# Patient Record
Sex: FEMALE | Race: BLACK OR AFRICAN AMERICAN | NOT HISPANIC OR LATINO | Employment: FULL TIME | URBAN - METROPOLITAN AREA
[De-identification: names, ages, dates, MRNs, and addresses within clinical notes are randomized per-mention and may not be internally consistent; named-entity substitution may affect disease eponyms.]

---

## 2017-05-26 ENCOUNTER — OFFICE VISIT (OUTPATIENT)
Dept: OBSTETRICS AND GYNECOLOGY | Facility: CLINIC | Age: 37
End: 2017-05-26
Payer: OTHER GOVERNMENT

## 2017-05-26 VITALS
HEIGHT: 64 IN | WEIGHT: 126.19 LBS | DIASTOLIC BLOOD PRESSURE: 72 MMHG | HEART RATE: 57 BPM | BODY MASS INDEX: 21.54 KG/M2 | SYSTOLIC BLOOD PRESSURE: 106 MMHG

## 2017-05-26 DIAGNOSIS — Z01.419 ENCOUNTER FOR WELL WOMAN EXAM WITH ROUTINE GYNECOLOGICAL EXAM: Primary | ICD-10-CM

## 2017-05-26 PROCEDURE — 88175 CYTOPATH C/V AUTO FLUID REDO: CPT | Performed by: PATHOLOGY

## 2017-05-26 PROCEDURE — 99999 PR PBB SHADOW E&M-EST. PATIENT-LVL II: CPT | Mod: PBBFAC,,, | Performed by: OBSTETRICS & GYNECOLOGY

## 2017-05-26 PROCEDURE — 99212 OFFICE O/P EST SF 10 MIN: CPT | Mod: PBBFAC,PO | Performed by: OBSTETRICS & GYNECOLOGY

## 2017-05-26 PROCEDURE — 99395 PREV VISIT EST AGE 18-39: CPT | Mod: S$PBB,,, | Performed by: OBSTETRICS & GYNECOLOGY

## 2017-05-26 PROCEDURE — 88141 CYTOPATH C/V INTERPRET: CPT | Mod: ,,, | Performed by: PATHOLOGY

## 2017-05-28 NOTE — PROGRESS NOTES
SUBJECTIVE:   36 y.o. female   for annual routine Pap and checkup. No LMP recorded. Patient is not currently having periods (Reason: Birth Control)..  She has no unusual complaints.        Past Medical History:   Diagnosis Date    PTSD (post-traumatic stress disorder)      Past Surgical History:   Procedure Laterality Date    HERNIA REPAIR       Social History     Social History    Marital status: Single     Spouse name: N/A    Number of children: N/A    Years of education: N/A     Occupational History    Not on file.     Social History Main Topics    Smoking status: Never Smoker    Smokeless tobacco: Not on file    Alcohol use No    Drug use: No    Sexual activity: Yes     Partners: Male     Birth control/ protection: Injection     Other Topics Concern    Not on file     Social History Narrative    No narrative on file     Family History   Problem Relation Age of Onset    Breast cancer Neg Hx     Miscarriages / Stillbirths Neg Hx      OB History    Para Term  AB Living   1 1  1     SAB TAB Ectopic Multiple Live Births             # Outcome Date GA Lbr Eliseo/2nd Weight Sex Delivery Anes PTL Lv   1   26w0d    Vag-Spont               Current Outpatient Prescriptions   Medication Sig Dispense Refill    medroxyPROGESTERone (DEPO-PROVERA) 150 mg/mL injection Inject 150 mg into the muscle every 3 (three) months.       No current facility-administered medications for this visit.      Allergies: Review of patient's allergies indicates no known allergies.     ROS:  Constitutional: no weight loss, weight gain, fever, fatigue  Eyes:  No vision changes, glasses/contacts  ENT/Mouth: No ulcers, sinus problems, ears ringing, headache  Cardiovascular: No inability to lie flat, chest pain, exercise intolerance, swelling, heart palpitations  Respiratory: No wheezing, coughing blood, shortness of breath, or cough  Gastrointestinal: No diarrhea, bloody stool, nausea/vomiting, constipation, gas,  "hemorrhoids  Genitourinary: No blood in urine, painful urination, urgency of urination, frequency of urination, incomplete emptying, incontinence, abnormal bleeding, painful periods, heavy periods, vaginal discharge, vaginal odor, painful intercourse, sexual problems, bleeding after intercourse.  Musculoskeletal: No muscle weakness  Skin/Breast: No painful breasts, nipple discharge, masses, rash, ulcers  Neurological: No passing out, seizures, numbness, headache  Endocrine: No diabetes, hypothyroid, hyperthyroid, hot flashes, hair loss, abnormal hair growth, ance  Psychiatric: No depression, crying  Hematologic: No bruises, bleeding, swollen lymph nodes, anemia.      OBJECTIVE:   The patient appears well, alert, oriented x 3, in no distress.  /72   Pulse (!) 57   Ht 5' 4" (1.626 m)   Wt 57.2 kg (126 lb 3.4 oz)   BMI 21.66 kg/m²   NECK: no thyromegaly, trachea midline  SKIN: no acne, striae, hirsutism  BREAST EXAM: breasts appear normal, no suspicious masses, no skin or nipple changes or axillary nodes  ABDOMEN: no hernias, masses, or hepatosplenomegaly  GENITALIA: normal external genitalia, no erythema, no discharge  URETHRA: normal urethra, normal urethral meatus  VAGINA: Normal  CERVIX: no lesions or cervical motion tenderness  UTERUS: normal size, contour, position, consistency, mobility, non-tender  ADNEXA: normal adnexa    \  ASSESSMENT:   well woman    PLAN:   pap smear  counseled on breast self exam  return annually or prn  "

## 2017-06-05 ENCOUNTER — TELEPHONE (OUTPATIENT)
Dept: OBSTETRICS AND GYNECOLOGY | Facility: CLINIC | Age: 37
End: 2017-06-05

## 2017-06-14 ENCOUNTER — OFFICE VISIT (OUTPATIENT)
Dept: OBSTETRICS AND GYNECOLOGY | Facility: CLINIC | Age: 37
End: 2017-06-14
Payer: COMMERCIAL

## 2017-06-14 ENCOUNTER — APPOINTMENT (OUTPATIENT)
Dept: LAB | Facility: HOSPITAL | Age: 37
End: 2017-06-14
Attending: OBSTETRICS & GYNECOLOGY
Payer: OTHER GOVERNMENT

## 2017-06-14 VITALS
DIASTOLIC BLOOD PRESSURE: 59 MMHG | HEIGHT: 64 IN | HEART RATE: 60 BPM | SYSTOLIC BLOOD PRESSURE: 106 MMHG | WEIGHT: 128.44 LBS | BODY MASS INDEX: 21.93 KG/M2

## 2017-06-14 DIAGNOSIS — R87.612 LGSIL ON PAP SMEAR OF CERVIX: Primary | ICD-10-CM

## 2017-06-14 DIAGNOSIS — Z01.812 PRE-PROCEDURE LAB EXAM: ICD-10-CM

## 2017-06-14 LAB
B-HCG UR QL: NEGATIVE
CTP QC/QA: YES

## 2017-06-14 PROCEDURE — 81025 URINE PREGNANCY TEST: CPT | Mod: PBBFAC,PO | Performed by: OBSTETRICS & GYNECOLOGY

## 2017-06-14 PROCEDURE — 99499 UNLISTED E&M SERVICE: CPT | Mod: S$PBB,,, | Performed by: OBSTETRICS & GYNECOLOGY

## 2017-06-14 PROCEDURE — 57454 BX/CURETT OF CERVIX W/SCOPE: CPT | Mod: PBBFAC,PO | Performed by: OBSTETRICS & GYNECOLOGY

## 2017-06-14 PROCEDURE — 88341 IMHCHEM/IMCYTCHM EA ADD ANTB: CPT | Mod: 26,,, | Performed by: PATHOLOGY

## 2017-06-14 PROCEDURE — 99212 OFFICE O/P EST SF 10 MIN: CPT | Mod: PBBFAC,PO | Performed by: OBSTETRICS & GYNECOLOGY

## 2017-06-14 PROCEDURE — 88305 TISSUE EXAM BY PATHOLOGIST: CPT | Performed by: PATHOLOGY

## 2017-06-14 PROCEDURE — 88342 IMHCHEM/IMCYTCHM 1ST ANTB: CPT | Mod: 26,,, | Performed by: PATHOLOGY

## 2017-06-14 PROCEDURE — 99999 PR PBB SHADOW E&M-EST. PATIENT-LVL II: CPT | Mod: PBBFAC,,, | Performed by: OBSTETRICS & GYNECOLOGY

## 2017-06-14 PROCEDURE — 57456 ENDOCERV CURETTAGE W/SCOPE: CPT | Mod: S$PBB,,, | Performed by: OBSTETRICS & GYNECOLOGY

## 2017-06-14 PROCEDURE — 88305 TISSUE EXAM BY PATHOLOGIST: CPT | Mod: 26,59,, | Performed by: PATHOLOGY

## 2017-06-14 NOTE — PROGRESS NOTES
COLPOSCOPY:    Gretta Carson is a 36 y.o. female   presents for colposcopy.  No LMP recorded. Patient is not currently having periods (Reason: Birth Control)..  Her most recent pap smear shows low-grade squamous intraepithelial neoplasia (LGSIL - encompassing HPV,mild dysplasia,AMANDA I).      The abnormal test findings were discussed, as well as HPV infection, need for colposcopy and possible biopsies to determine the plan of care, treatments available, the minimal risk of bleeding and infection with colposcopy, and alternatives to colposcopy and she agrees to proceed.      UPT is negative    COLPOSCOPY EXAM:   TIME OUT PERFORMED.     visible lesion(s) at 5 o'clock    Biopsy was taken at 5 o'clock.  ECC was performed    Hemostasis was adequate with application of silver nitrate.  The speculum was removed.  The patient did tolerate the procedure well.    All collected specimens sent to pathology for histologic analysis.    Post-colposcopy counseling:  The patient was instructed to manage post-colposcopy cramping with NSAIDs or Tylenol, or with a prescription per the medication card.  Avoid intercourse, douching, or tampons in the vagina for at least 2-3 days.  Expect a clumpy blackish discharge due to Monsel's solution application for several days.  Report heavy bleeding, worsening pain or pain that does not respond to above medications, or foul-smelling vaginal discharge. HPV vaccine recommended according to FDA age guidelines.  Importance of follow-up stressed.      Follow up based on colposcopy results.

## 2017-06-21 ENCOUNTER — TELEPHONE (OUTPATIENT)
Dept: OBSTETRICS AND GYNECOLOGY | Facility: CLINIC | Age: 37
End: 2017-06-21

## 2017-07-07 ENCOUNTER — OFFICE VISIT (OUTPATIENT)
Dept: OBSTETRICS AND GYNECOLOGY | Facility: CLINIC | Age: 37
End: 2017-07-07
Payer: OTHER GOVERNMENT

## 2017-07-07 VITALS
SYSTOLIC BLOOD PRESSURE: 109 MMHG | BODY MASS INDEX: 21.51 KG/M2 | HEIGHT: 64 IN | HEART RATE: 67 BPM | DIASTOLIC BLOOD PRESSURE: 76 MMHG | WEIGHT: 126 LBS

## 2017-07-07 DIAGNOSIS — D06.9 CIN III (CERVICAL INTRAEPITHELIAL NEOPLASIA GRADE III) WITH SEVERE DYSPLASIA: Primary | ICD-10-CM

## 2017-07-07 PROCEDURE — 99212 OFFICE O/P EST SF 10 MIN: CPT | Mod: PBBFAC,PO | Performed by: OBSTETRICS & GYNECOLOGY

## 2017-07-07 PROCEDURE — 99999 PR PBB SHADOW E&M-EST. PATIENT-LVL II: CPT | Mod: PBBFAC,,, | Performed by: OBSTETRICS & GYNECOLOGY

## 2017-07-07 PROCEDURE — 99213 OFFICE O/P EST LOW 20 MIN: CPT | Mod: S$PBB,,, | Performed by: OBSTETRICS & GYNECOLOGY

## 2017-07-13 ENCOUNTER — TELEPHONE (OUTPATIENT)
Dept: OBSTETRICS AND GYNECOLOGY | Facility: CLINIC | Age: 37
End: 2017-07-13

## 2017-07-13 ENCOUNTER — PATIENT MESSAGE (OUTPATIENT)
Dept: OBSTETRICS AND GYNECOLOGY | Facility: CLINIC | Age: 37
End: 2017-07-13

## 2017-07-13 NOTE — TELEPHONE ENCOUNTER
----- Message from Min Haines sent at 7/13/2017 10:09 AM CDT -----  Contact: pt   Pt is requesting a callback, have some questions about her procedure on 7-31-17  Call Back#[phone]  Thanks

## 2017-07-13 NOTE — TELEPHONE ENCOUNTER
Left message informing patient that I would forward info to her via [facility] and to call back if she has additional questions.

## 2017-07-27 ENCOUNTER — PATIENT MESSAGE (OUTPATIENT)
Dept: OBSTETRICS AND GYNECOLOGY | Facility: CLINIC | Age: 37
End: 2017-07-27

## 2017-07-31 ENCOUNTER — TELEPHONE (OUTPATIENT)
Dept: OBSTETRICS AND GYNECOLOGY | Facility: CLINIC | Age: 37
End: 2017-07-31

## 2017-07-31 ENCOUNTER — PATIENT MESSAGE (OUTPATIENT)
Dept: OBSTETRICS AND GYNECOLOGY | Facility: CLINIC | Age: 37
End: 2017-07-31

## 2017-07-31 DIAGNOSIS — D06.9 CIN III (CERVICAL INTRAEPITHELIAL NEOPLASIA GRADE III) WITH SEVERE DYSPLASIA: Primary | ICD-10-CM

## 2017-07-31 NOTE — TELEPHONE ENCOUNTER
Would the [facility] have something in their system if you have it scheduled at the surgery suite is it all connected??

## 2017-07-31 NOTE — TELEPHONE ENCOUNTER
----- Message from Tiffani Lott sent at 7/31/2017 10:41 AM CDT -----  Contact: self  Please call back at [phone] ([phone]) --personal

## 2017-08-01 ENCOUNTER — ANESTHESIA EVENT (OUTPATIENT)
Dept: SURGERY | Facility: AMBULARY SURGERY CENTER | Age: 37
End: 2017-08-01
Payer: OTHER GOVERNMENT

## 2017-08-01 NOTE — TELEPHONE ENCOUNTER
----- Message from Caroline Frost sent at 8/1/2017 10:33 AM CDT -----  Contact: Clary Beebe would like to speak to a nurse regarding the patient's short term disability   Needs a surgery date and diagnosis code    Please call her at  [phone]   Claim# [id number]    Thanks

## 2017-08-02 ENCOUNTER — TELEPHONE (OUTPATIENT)
Dept: OBSTETRICS AND GYNECOLOGY | Facility: CLINIC | Age: 37
End: 2017-08-02

## 2017-08-02 ENCOUNTER — SURGERY (OUTPATIENT)
Age: 37
End: 2017-08-02

## 2017-08-02 ENCOUNTER — ANESTHESIA (OUTPATIENT)
Dept: SURGERY | Facility: AMBULARY SURGERY CENTER | Age: 37
End: 2017-08-02
Payer: OTHER GOVERNMENT

## 2017-08-02 ENCOUNTER — HOSPITAL ENCOUNTER (OUTPATIENT)
Facility: AMBULARY SURGERY CENTER | Age: 37
Discharge: HOME | End: 2017-08-02
Attending: OBSTETRICS & GYNECOLOGY | Admitting: OBSTETRICS & GYNECOLOGY
Payer: OTHER GOVERNMENT

## 2017-08-02 DIAGNOSIS — D06.9 CIN III (CERVICAL INTRAEPITHELIAL NEOPLASIA GRADE III) WITH SEVERE DYSPLASIA: Primary | ICD-10-CM

## 2017-08-02 LAB
B-HCG UR QL: NEGATIVE
CTP QC/QA: YES

## 2017-08-02 PROCEDURE — 88305 TISSUE EXAM BY PATHOLOGIST: CPT | Performed by: PATHOLOGY

## 2017-08-02 PROCEDURE — 57522 CONIZATION OF CERVIX: CPT | Mod: ,,, | Performed by: OBSTETRICS & GYNECOLOGY

## 2017-08-02 PROCEDURE — 57522 CONIZATION OF CERVIX: CPT | Performed by: OBSTETRICS & GYNECOLOGY

## 2017-08-02 PROCEDURE — D9220A PRA ANESTHESIA: Mod: ,,, | Performed by: ANESTHESIOLOGY

## 2017-08-02 RX ORDER — DEXAMETHASONE SODIUM PHOSPHATE 4 MG/ML
INJECTION, SOLUTION INTRA-ARTICULAR; INTRALESIONAL; INTRAMUSCULAR; INTRAVENOUS; SOFT TISSUE
Status: DISCONTINUED
Start: 2017-08-02 | End: 2017-08-02 | Stop reason: HOSPADM

## 2017-08-02 RX ORDER — LIDOCAINE HYDROCHLORIDE 10 MG/ML
1 INJECTION, SOLUTION EPIDURAL; INFILTRATION; INTRACAUDAL; PERINEURAL ONCE
Status: COMPLETED | OUTPATIENT
Start: 2017-08-02 | End: 2017-08-02

## 2017-08-02 RX ORDER — DIPHENHYDRAMINE HYDROCHLORIDE 50 MG/ML
25 INJECTION INTRAMUSCULAR; INTRAVENOUS EVERY 6 HOURS PRN
Status: DISCONTINUED | OUTPATIENT
Start: 2017-08-02 | End: 2017-08-02 | Stop reason: HOSPADM

## 2017-08-02 RX ORDER — OXYCODONE AND ACETAMINOPHEN 5; 325 MG/1; MG/1
1 TABLET ORAL EVERY 4 HOURS PRN
Qty: 20 TABLET | Refills: 0 | Status: SHIPPED | OUTPATIENT
Start: 2017-08-02 | End: 2017-09-01

## 2017-08-02 RX ORDER — IBUPROFEN 800 MG/1
800 TABLET ORAL EVERY 8 HOURS PRN
Qty: 30 TABLET | Refills: 0 | Status: SHIPPED | OUTPATIENT
Start: 2017-08-02 | End: 2017-11-03

## 2017-08-02 RX ORDER — ONDANSETRON 2 MG/ML
INJECTION INTRAMUSCULAR; INTRAVENOUS
Status: DISCONTINUED
Start: 2017-08-02 | End: 2017-08-02 | Stop reason: HOSPADM

## 2017-08-02 RX ORDER — OXYCODONE HYDROCHLORIDE 5 MG/1
5 TABLET ORAL ONCE AS NEEDED
Status: COMPLETED | OUTPATIENT
Start: 2017-08-03 | End: 2017-08-02

## 2017-08-02 RX ORDER — MIDAZOLAM HYDROCHLORIDE 1 MG/ML
INJECTION INTRAMUSCULAR; INTRAVENOUS
Status: DISCONTINUED
Start: 2017-08-02 | End: 2017-08-02 | Stop reason: HOSPADM

## 2017-08-02 RX ORDER — ONDANSETRON 2 MG/ML
4 INJECTION INTRAMUSCULAR; INTRAVENOUS ONCE
Status: DISCONTINUED | OUTPATIENT
Start: 2017-08-02 | End: 2017-08-02 | Stop reason: HOSPADM

## 2017-08-02 RX ORDER — FENTANYL CITRATE 50 UG/ML
INJECTION, SOLUTION INTRAMUSCULAR; INTRAVENOUS
Status: DISCONTINUED | OUTPATIENT
Start: 2017-08-02 | End: 2017-08-02

## 2017-08-02 RX ORDER — ONDANSETRON 2 MG/ML
INJECTION INTRAMUSCULAR; INTRAVENOUS
Status: DISCONTINUED | OUTPATIENT
Start: 2017-08-02 | End: 2017-08-02

## 2017-08-02 RX ORDER — MEPERIDINE HYDROCHLORIDE 25 MG/ML
12.5 INJECTION INTRAMUSCULAR; INTRAVENOUS; SUBCUTANEOUS ONCE AS NEEDED
Status: DISCONTINUED | OUTPATIENT
Start: 2017-08-02 | End: 2017-08-02 | Stop reason: HOSPADM

## 2017-08-02 RX ORDER — HYDROMORPHONE HYDROCHLORIDE 2 MG/ML
0.2 INJECTION, SOLUTION INTRAMUSCULAR; INTRAVENOUS; SUBCUTANEOUS EVERY 5 MIN PRN
Status: DISCONTINUED | OUTPATIENT
Start: 2017-08-02 | End: 2017-08-02 | Stop reason: HOSPADM

## 2017-08-02 RX ORDER — PROPOFOL 10 MG/ML
INJECTION, EMULSION INTRAVENOUS
Status: DISCONTINUED
Start: 2017-08-02 | End: 2017-08-02 | Stop reason: HOSPADM

## 2017-08-02 RX ORDER — FENTANYL CITRATE 50 UG/ML
INJECTION, SOLUTION INTRAMUSCULAR; INTRAVENOUS
Status: DISCONTINUED
Start: 2017-08-02 | End: 2017-08-02 | Stop reason: HOSPADM

## 2017-08-02 RX ORDER — IBUPROFEN 200 MG
800 TABLET ORAL ONCE
Status: COMPLETED | OUTPATIENT
Start: 2017-08-02 | End: 2017-08-02

## 2017-08-02 RX ORDER — HYDROCODONE BITARTRATE AND ACETAMINOPHEN 5; 325 MG/1; MG/1
1 TABLET ORAL EVERY 4 HOURS PRN
Status: DISCONTINUED | OUTPATIENT
Start: 2017-08-02 | End: 2017-08-02 | Stop reason: HOSPADM

## 2017-08-02 RX ORDER — LIDOCAINE HYDROCHLORIDE 20 MG/ML
INJECTION, SOLUTION EPIDURAL; INFILTRATION; INTRACAUDAL; PERINEURAL
Status: DISCONTINUED
Start: 2017-08-02 | End: 2017-08-02 | Stop reason: HOSPADM

## 2017-08-02 RX ORDER — SODIUM CHLORIDE 0.9 % (FLUSH) 0.9 %
3 SYRINGE (ML) INJECTION
Status: DISCONTINUED | OUTPATIENT
Start: 2017-08-02 | End: 2017-08-02 | Stop reason: HOSPADM

## 2017-08-02 RX ORDER — IBUPROFEN 200 MG
TABLET ORAL
Status: COMPLETED
Start: 2017-08-02 | End: 2017-08-02

## 2017-08-02 RX ORDER — FENTANYL CITRATE 50 UG/ML
25 INJECTION, SOLUTION INTRAMUSCULAR; INTRAVENOUS EVERY 5 MIN PRN
Status: DISCONTINUED | OUTPATIENT
Start: 2017-08-02 | End: 2017-08-02 | Stop reason: HOSPADM

## 2017-08-02 RX ORDER — DEXAMETHASONE SODIUM PHOSPHATE 4 MG/ML
INJECTION, SOLUTION INTRA-ARTICULAR; INTRALESIONAL; INTRAMUSCULAR; INTRAVENOUS; SOFT TISSUE
Status: DISCONTINUED | OUTPATIENT
Start: 2017-08-02 | End: 2017-08-02

## 2017-08-02 RX ORDER — PROPOFOL 10 MG/ML
VIAL (ML) INTRAVENOUS
Status: DISCONTINUED | OUTPATIENT
Start: 2017-08-02 | End: 2017-08-02

## 2017-08-02 RX ORDER — LIDOCAINE HYDROCHLORIDE 20 MG/ML
JELLY TOPICAL
Status: DISCONTINUED
Start: 2017-08-02 | End: 2017-08-02 | Stop reason: HOSPADM

## 2017-08-02 RX ORDER — LIDOCAINE HCL/PF 100 MG/5ML
SYRINGE (ML) INTRAVENOUS
Status: DISCONTINUED | OUTPATIENT
Start: 2017-08-02 | End: 2017-08-02

## 2017-08-02 RX ORDER — SODIUM CHLORIDE, SODIUM LACTATE, POTASSIUM CHLORIDE, CALCIUM CHLORIDE 600; 310; 30; 20 MG/100ML; MG/100ML; MG/100ML; MG/100ML
INJECTION, SOLUTION INTRAVENOUS CONTINUOUS
Status: DISCONTINUED | OUTPATIENT
Start: 2017-08-02 | End: 2017-08-02 | Stop reason: HOSPADM

## 2017-08-02 RX ORDER — GLYCOPYRROLATE 0.2 MG/ML
INJECTION INTRAMUSCULAR; INTRAVENOUS
Status: DISCONTINUED | OUTPATIENT
Start: 2017-08-02 | End: 2017-08-02

## 2017-08-02 RX ORDER — BUPIVACAINE HYDROCHLORIDE AND EPINEPHRINE 2.5; 5 MG/ML; UG/ML
INJECTION, SOLUTION EPIDURAL; INFILTRATION; INTRACAUDAL; PERINEURAL
Status: DISCONTINUED
Start: 2017-08-02 | End: 2017-08-02 | Stop reason: WASHOUT

## 2017-08-02 RX ORDER — OXYCODONE HYDROCHLORIDE 5 MG/1
TABLET ORAL
Status: DISCONTINUED
Start: 2017-08-02 | End: 2017-08-02 | Stop reason: HOSPADM

## 2017-08-02 RX ORDER — KETOROLAC TROMETHAMINE 30 MG/ML
INJECTION, SOLUTION INTRAMUSCULAR; INTRAVENOUS
Status: DISCONTINUED
Start: 2017-08-02 | End: 2017-08-02 | Stop reason: HOSPADM

## 2017-08-02 RX ORDER — MIDAZOLAM HYDROCHLORIDE 1 MG/ML
INJECTION, SOLUTION INTRAMUSCULAR; INTRAVENOUS
Status: DISCONTINUED | OUTPATIENT
Start: 2017-08-02 | End: 2017-08-02

## 2017-08-02 RX ADMIN — SODIUM CHLORIDE, SODIUM LACTATE, POTASSIUM CHLORIDE, CALCIUM CHLORIDE: 600; 310; 30; 20 INJECTION, SOLUTION INTRAVENOUS at 11:08

## 2017-08-02 RX ADMIN — MIDAZOLAM HYDROCHLORIDE 2 MG: 1 INJECTION, SOLUTION INTRAMUSCULAR; INTRAVENOUS at 11:08

## 2017-08-02 RX ADMIN — GLYCOPYRROLATE 0.2 MG: 0.2 INJECTION INTRAMUSCULAR; INTRAVENOUS at 12:08

## 2017-08-02 RX ADMIN — Medication 800 MG: at 02:08

## 2017-08-02 RX ADMIN — FENTANYL CITRATE 50 MCG: 50 INJECTION, SOLUTION INTRAMUSCULAR; INTRAVENOUS at 12:08

## 2017-08-02 RX ADMIN — OXYCODONE HYDROCHLORIDE 5 MG: 5 TABLET ORAL at 01:08

## 2017-08-02 RX ADMIN — LIDOCAINE HYDROCHLORIDE 0.2 ML: 10 INJECTION, SOLUTION EPIDURAL; INFILTRATION; INTRACAUDAL; PERINEURAL at 11:08

## 2017-08-02 RX ADMIN — Medication 150 MG: at 12:08

## 2017-08-02 RX ADMIN — ONDANSETRON 4 MG: 2 INJECTION INTRAMUSCULAR; INTRAVENOUS at 12:08

## 2017-08-02 RX ADMIN — Medication 100 MG: at 12:08

## 2017-08-02 RX ADMIN — DEXAMETHASONE SODIUM PHOSPHATE 4 MG: 4 INJECTION, SOLUTION INTRA-ARTICULAR; INTRALESIONAL; INTRAMUSCULAR; INTRAVENOUS; SOFT TISSUE at 12:08

## 2017-08-02 NOTE — TRANSFER OF CARE
"Anesthesia Transfer of Care Note    Patient: Gretta Carson    Procedure(s) Performed: Procedure(s) (LRB):  LEEP PROCEDURE (N/A)    Patient location: PACU    Anesthesia Type: general    Transport from OR: Transported from OR on 2-3 L/min O2 by NC with adequate spontaneous ventilation    Post pain: adequate analgesia    Post assessment: no apparent anesthetic complications    Post vital signs: stable    Level of consciousness: awake    Nausea/Vomiting: no nausea/vomiting    Complications: none    Transfer of care protocol was followed      Last vitals:   Visit Vitals  BP (!) 96/53   Pulse 65   Temp 36.8 °C (98.3 °F) (Oral)   Resp 16   Ht 5' 4" (1.626 m)   Wt 56.7 kg (125 lb)   SpO2 97%   Breastfeeding? No   BMI 21.46 kg/m²     "

## 2017-08-02 NOTE — ANESTHESIA PREPROCEDURE EVALUATION
08/02/2017  Gretta Carson is a 36 y.o., female.    Anesthesia Evaluation    I have reviewed the Patient Summary Reports.    I have reviewed the Nursing Notes.   I have reviewed the Medications.     Review of Systems  Anesthesia Hx:  No problems with previous Anesthesia Denies Hx of Anesthetic complications    Social:  Non-Smoker    Cardiovascular:   Denies Hypertension.  Denies MI.  Denies CAD.    Denies CABG/stent.   Denies Angina.    Pulmonary:   Denies COPD.  Denies Asthma.  Denies Recent URI.    Renal/:   Denies Chronic Renal Disease.     Hepatic/GI:   Denies GERD. Denies Liver Disease.    OB/GYN/PEDS:  AMANDA III (cervical intraepithelial neoplasia grade III) with severe dysplasia (D06.9)   Neurological:   Denies TIA. Denies CVA. Denies Seizures.    Endocrine:   Denies Diabetes. Denies Hypothyroidism.    Psych:   Denies Psychiatric History. PTSD (post-traumatic stress disorder)         Physical Exam  General:  Well nourished    Airway/Jaw/Neck:  Airway Findings: Mouth Opening: Normal Tongue: Normal  General Airway Assessment: Adult, Good  Mallampati: II  Improves to II with phonation.  TM Distance: 4-6 cm      Dental:  Dental Findings: In tact   Chest/Lungs:  Chest/Lungs Findings: Clear to auscultation, Normal Respiratory Rate     Heart/Vascular:  Heart Findings: Rate: Normal  Rhythm: Regular Rhythm  Sounds: Normal  Heart murmur: negative       Mental Status:  Mental Status Findings:  Cooperative, Alert and Oriented         Anesthesia Plan  Type of Anesthesia, risks & benefits discussed:  Anesthesia Type:  general  Patient's Preference:   Intra-op Monitoring Plan:   Intra-op Monitoring Plan Comments:   Post Op Pain Control Plan:   Post Op Pain Control Plan Comments:   Induction:   IV  Beta Blocker:  Patient is not currently on a Beta-Blocker (No further documentation required).       Informed Consent:  Patient understands risks and agrees with Anesthesia plan.  Questions answered. Anesthesia consent signed with patient.  ASA Score: 1     Day of Surgery Review of History & Physical: I have interviewed and examined the patient. I have reviewed the patient's H&P dated:  There are no significant changes.          Ready For Surgery From Anesthesia Perspective.

## 2017-08-02 NOTE — H&P (VIEW-ONLY)
36 y.o.  presents for pre-op H&P for leep.  No LMP recorded. Patient is not currently having periods (Reason: Birth Control)..    Past Medical History:   Diagnosis Date    Abnormal Pap smear of cervix     PTSD (post-traumatic stress disorder)      Past Surgical History:   Procedure Laterality Date    COLPOSCOPY      HERNIA REPAIR       Family History   Problem Relation Age of Onset    Breast cancer Neg Hx     Miscarriages / Stillbirths Neg Hx      Review of patient's allergies indicates:  No Known Allergies    Current Outpatient Prescriptions:     medroxyPROGESTERone (DEPO-PROVERA) 150 mg/mL injection, Inject 150 mg into the muscle every 3 (three) months., Disp: , Rfl:   Social History     Social History    Marital status: Single     Spouse name: N/A    Number of children: N/A    Years of education: N/A     Occupational History    Not on file.     Social History Main Topics    Smoking status: Never Smoker    Smokeless tobacco: Never Used    Alcohol use No    Drug use: No    Sexual activity: Yes     Partners: Male     Birth control/ protection: Injection     Other Topics Concern    Not on file     Social History Narrative    No narrative on file         Last pap hsil  Last pathology janee III  Last ultrasound none    Vitals:    17 1410   BP: 109/76   Pulse: 67     General Appearance: Alert, appropriate appearance for age. No acute distress, Chest/Respiratory Exam: Normal.   Cardiovascular Exam: regular rate and rhythm   Gastrointestinal Exam: soft, non-tender; bowel sounds normal; no masses,  no organomegaly  Pelvic Exam Female: Exam deferred.   Psychiatric Exam: Alert and oriented, appropriate affect.    Assessment: JANEE III    Plan: LEEP scheduled  I have discussed the risks, benefits, indications, and alternatives of the procedure in detail.  The patient verbalizes her understanding.  All questions answered.  Consents signed.  The patient agrees to proceed to proceed as planned.

## 2017-08-02 NOTE — TELEPHONE ENCOUNTER
----- Message from Risa Gerber sent at 8/2/2017  1:17 PM CDT -----  Contact: Michelle with [facility]  Patient needs 2 week appointment due to discharged on 08/02/17 post op. Please call patient at [phone]. Thanks!

## 2017-08-02 NOTE — DISCHARGE INSTRUCTIONS
No douches, tampons, intercourse or tub baths for 1 week;     Some bleeding and pain is expected, but should be no greater than a normal period. If heavy bleeding or pain persists, please contact your doctor;     Keep followup appointment as scheduled.     Do not drive or make important business or legal decisions for 24 hours.     Call MD if severe bleeding, fever > 101, nausea/vomitting    Rx for pain:  Ibuprofen, Percocet

## 2017-08-02 NOTE — DISCHARGE SUMMARY
Discharge Summary  Gynecology      Admit Date: 8/2/2017    Discharge Date and Time: 8/2/2017 1:33 PM     Attending Physician: Shannan Pierre MD    Principal Diagnoses: AMANDA III (cervical intraepithelial neoplasia grade III) with severe dysplasia    Other Secondary Diagnoses:     Discharged Condition: good    Procedure: leep    Disposition: Home or Self Care    Patient Instructions:   Current Discharge Medication List      CONTINUE these medications which have NOT CHANGED    Details   medroxyPROGESTERone (DEPO-PROVERA) 150 mg/mL injection Inject 150 mg into the muscle every 3 (three) months.             No discharge procedures on file.    Diet: regular    Activity: pelvic rest, no heavy lifting, no driving    Follow up: 2 weeks

## 2017-08-02 NOTE — PLAN OF CARE
Stable, States ready to go home, renetta po fluids, pain tolerable, amb to BR then to car with RN and mother

## 2017-08-02 NOTE — LETTER
August 2, 2017                       [address] [place] [id number]  Phone: [phone]   August 2, 2017     Patient: Gretta Carson   YOB: 1980   Date of Visit: 7/31/2017       To Whom it May Concern:    Gretta Carson was scheduled for surgery on Monday, 7/31/17.  On that morning, I realized that I forgot to put the order for surgery in the computer and therefore, it was not transmitted to her insurance and the surgery center.  I called and explained to her that day that I made an error.  I tried to expedite insurance approval and planned to do the surgery immediately on insurance approval.  It was approved on Tuesday evening, and surgery will take place on today, 8/2/17. Ms Carson is not at fault for missing work, it was my error.        If you have any questions or concerns, please don't hesitate to call.    Sincerely,           Shannan Pierre MD

## 2017-08-02 NOTE — OP NOTE
DATE of Procedure:  8/2/17    PREOP DIAGNOSIS: AMANDA III     POSTOP DIAGNOSIS:  same     PROCEDURES: LEEP     ANESTHESIA: general     ESTIMATED BLOOD LOSS: 100     COMPLICATIONS: none       PROCEDURE IN Detail:     Pt was taken to the operating room placed in dorsal lithotomy. She was placed under general anesthesia and prepped and draped in normal sterile fashion.  Speculum was placed.  A loop was used to remove the ectocervix in 2 passes.  The specimens were marked with suture at anterior portion.  The base was cauterized with good hemostasis.  Monsels was placed.  speculum was removed.  Lap and needle count was correct.  Pt went to recovery room in stable condition.

## 2017-08-02 NOTE — ANESTHESIA POSTPROCEDURE EVALUATION
"Anesthesia Post Evaluation    Patient: Gretta Carson    Procedure(s) Performed: Procedure(s) (LRB):  LEEP PROCEDURE (N/A)    Final Anesthesia Type: general  Patient location during evaluation: PACU  Patient participation: Yes- Able to Participate  Level of consciousness: awake and alert and oriented  Post-procedure vital signs: reviewed and stable  Pain management: adequate  Airway patency: patent  PONV status at discharge: No PONV  Anesthetic complications: no      Cardiovascular status: blood pressure returned to baseline  Respiratory status: unassisted, spontaneous ventilation and room air  Hydration status: euvolemic  Follow-up not needed.        Visit Vitals  BP (!) 93/57   Pulse 60   Temp 36.8 °C (98.3 °F) (Oral)   Resp 18   Ht 5' 4" (1.626 m)   Wt 56.7 kg (125 lb)   SpO2 100%   Breastfeeding? No   BMI 21.46 kg/m²       Pain/Jadiel Score: Pain Assessment Performed: Yes (8/2/2017 12:38 PM)  Presence of Pain: other (see comments) (asleep) (8/2/2017 12:38 PM)  Jadiel Score: 4 (8/2/2017 12:38 PM)      "

## 2017-08-03 VITALS
HEIGHT: 64 IN | HEART RATE: 62 BPM | TEMPERATURE: 98 F | OXYGEN SATURATION: 99 % | DIASTOLIC BLOOD PRESSURE: 79 MMHG | BODY MASS INDEX: 21.34 KG/M2 | SYSTOLIC BLOOD PRESSURE: 113 MMHG | WEIGHT: 125 LBS | RESPIRATION RATE: 20 BRPM

## 2017-08-08 ENCOUNTER — TELEPHONE (OUTPATIENT)
Dept: OBSTETRICS AND GYNECOLOGY | Facility: CLINIC | Age: 37
End: 2017-08-08

## 2017-08-09 NOTE — TELEPHONE ENCOUNTER
----- Message from Char Hanson sent at 8/9/2017 11:03 AM CDT -----  Contact: Clary Means with [facility] [employer] - [phone]/Claim #[id number]/calling to verify diagnosis/surgery she had done/estimated time of return to work/date of surgery/Clary does have a confidential private line and it is OK to leave a message if she is unable to answer her phone

## 2017-08-14 ENCOUNTER — PATIENT MESSAGE (OUTPATIENT)
Dept: OBSTETRICS AND GYNECOLOGY | Facility: CLINIC | Age: 37
End: 2017-08-14

## 2017-08-16 ENCOUNTER — OFFICE VISIT (OUTPATIENT)
Dept: OBSTETRICS AND GYNECOLOGY | Facility: CLINIC | Age: 37
End: 2017-08-16
Payer: OTHER GOVERNMENT

## 2017-08-16 VITALS
SYSTOLIC BLOOD PRESSURE: 100 MMHG | HEIGHT: 64 IN | HEART RATE: 51 BPM | WEIGHT: 127.63 LBS | DIASTOLIC BLOOD PRESSURE: 76 MMHG | BODY MASS INDEX: 21.79 KG/M2

## 2017-08-16 DIAGNOSIS — Z98.890 POST-OPERATIVE STATE: Primary | ICD-10-CM

## 2017-08-16 PROCEDURE — 99024 POSTOP FOLLOW-UP VISIT: CPT | Mod: ,,, | Performed by: OBSTETRICS & GYNECOLOGY

## 2017-08-16 PROCEDURE — 99213 OFFICE O/P EST LOW 20 MIN: CPT | Mod: PBBFAC,PO | Performed by: OBSTETRICS & GYNECOLOGY

## 2017-08-16 PROCEDURE — 99999 PR PBB SHADOW E&M-EST. PATIENT-LVL III: CPT | Mod: PBBFAC,,, | Performed by: OBSTETRICS & GYNECOLOGY

## 2017-08-30 NOTE — PROGRESS NOTES
Subjective:       Patient ID: Gretta Carson is a 36 y.o. female.    Chief Complaint:  Post-op Evaluation (LEEP)      History of Present Illness  HPI  Postoperative Follow-up  Patient presents to the clinic 2 weeks status post leep for janee III. Eating a regular diet without difficulty. Bowel movements are normal. Pain is controlled without any medications.        GYN & OB HistoryNo LMP recorded. Patient is not currently having periods (Reason: Birth Control).   Date of Last Pap: 2017    OB History    Para Term  AB Living   1 1   1       SAB TAB Ectopic Multiple Live Births                  # Outcome Date GA Lbr Eliseo/2nd Weight Sex Delivery Anes PTL Lv   1   26w0d    Vag-Spont             Review of Systems  Review of Systems   Constitutional: Negative.    Respiratory: Negative.    Cardiovascular: Negative.    Gastrointestinal: Negative.    Genitourinary: Positive for menstrual problem and pelvic pain.   Musculoskeletal: Negative.    Skin:  Negative.   Neurological: Negative.    Psychiatric/Behavioral: Negative.            Objective:    Physical Exam:   Constitutional: She is oriented to person, place, and time. She appears well-developed and well-nourished.    HENT:   Head: Normocephalic and atraumatic.    Eyes: EOM are normal.    Neck: Normal range of motion.    Cardiovascular: Normal rate.     Pulmonary/Chest: Effort normal.        Abdominal: Hernia confirmed negative in the right inguinal area and confirmed negative in the left inguinal area.     Genitourinary: Vagina normal and uterus normal. Rectal exam shows no external hemorrhoid. There is no rash, tenderness, lesion or injury on the right labia. There is no rash, tenderness, lesion or injury on the left labia. No erythema, tenderness, bleeding, rectocele, cystocele or unspecified prolapse of vaginal walls in the vagina. No foreign body in the vagina. No signs of injury around the vagina. No vaginal discharge found. Labial bartholins  normal.Cervix exhibits friability (healing with some bleeding and pain). Cervix exhibits no motion tenderness, no discharge and presence. Also,  no visible IUD strings and no recent pap smear            Musculoskeletal: Normal range of motion and moves all extremeties.      Lymphadenopathy:        Right: No inguinal adenopathy present.        Left: No inguinal adenopathy present.    Neurological: She is alert and oriented to person, place, and time.    Skin: Skin is warm and dry.    Psychiatric: She has a normal mood and affect. Her behavior is normal. Judgment and thought content normal.          Assessment:        1. Post-operative state                Plan:      Follow up 1 week

## 2017-08-31 ENCOUNTER — PATIENT MESSAGE (OUTPATIENT)
Dept: OBSTETRICS AND GYNECOLOGY | Facility: CLINIC | Age: 37
End: 2017-08-31

## 2017-09-01 ENCOUNTER — OFFICE VISIT (OUTPATIENT)
Dept: OBSTETRICS AND GYNECOLOGY | Facility: CLINIC | Age: 37
End: 2017-09-01
Payer: OTHER GOVERNMENT

## 2017-09-01 VITALS
HEART RATE: 74 BPM | DIASTOLIC BLOOD PRESSURE: 67 MMHG | SYSTOLIC BLOOD PRESSURE: 102 MMHG | WEIGHT: 127.31 LBS | HEIGHT: 64 IN | BODY MASS INDEX: 21.74 KG/M2

## 2017-09-01 DIAGNOSIS — Z98.890 POST-OPERATIVE STATE: Primary | ICD-10-CM

## 2017-09-01 PROCEDURE — 99999 PR PBB SHADOW E&M-EST. PATIENT-LVL II: CPT | Mod: PBBFAC,,, | Performed by: OBSTETRICS & GYNECOLOGY

## 2017-09-01 PROCEDURE — 99212 OFFICE O/P EST SF 10 MIN: CPT | Mod: PBBFAC,PO | Performed by: OBSTETRICS & GYNECOLOGY

## 2017-09-01 PROCEDURE — 99024 POSTOP FOLLOW-UP VISIT: CPT | Mod: ,,, | Performed by: OBSTETRICS & GYNECOLOGY

## 2017-09-11 NOTE — PROGRESS NOTES
Subjective:       Patient ID: Gretta Carson is a 36 y.o. female.    Chief Complaint:  Follow-up (still some bleeding, recheck)      History of Present Illness  HPI  Pt here for follow up for bleeding after leep    GYN & OB History  No LMP recorded. Patient is not currently having periods (Reason: Birth Control).   Date of Last Pap: 2017    OB History    Para Term  AB Living   1 1   1       SAB TAB Ectopic Multiple Live Births                  # Outcome Date GA Lbr Eliseo/2nd Weight Sex Delivery Anes PTL Lv   1   26w0d    Vag-Spont             Review of Systems  Review of Systems   Constitutional: Negative.    Respiratory: Negative.    Cardiovascular: Negative.    Gastrointestinal: Negative.    Genitourinary: Negative.    Musculoskeletal: Negative.    Skin:  Negative.   Neurological: Negative.    Psychiatric/Behavioral: Negative.            Objective:    Physical Exam:   Constitutional: She is oriented to person, place, and time. She appears well-developed and well-nourished.    HENT:   Head: Normocephalic and atraumatic.    Eyes: EOM are normal.    Neck: Normal range of motion.    Cardiovascular: Normal rate.     Pulmonary/Chest: Effort normal.        Abdominal: Hernia confirmed negative in the right inguinal area and confirmed negative in the left inguinal area.     Genitourinary: Vagina normal and uterus normal. Rectal exam shows no external hemorrhoid. There is no rash, tenderness, lesion or injury on the right labia. There is no rash, tenderness, lesion or injury on the left labia. Cervix is normal. No erythema, tenderness, bleeding, rectocele, cystocele or unspecified prolapse of vaginal walls in the vagina. No foreign body in the vagina. No signs of injury around the vagina. No vaginal discharge found. Labial bartholins normal.Cervix exhibits friability (area cauterized with silver nitrate). Cervix exhibits no motion tenderness, no discharge and presence. Also,  no visible IUD strings  and no recent pap smear            Musculoskeletal: Normal range of motion and moves all extremeties.      Lymphadenopathy:        Right: No inguinal adenopathy present.        Left: No inguinal adenopathy present.    Neurological: She is alert and oriented to person, place, and time.    Skin: Skin is warm and dry.    Psychiatric: She has a normal mood and affect. Her behavior is normal. Judgment and thought content normal.          Assessment:        1. Post-operative state                Plan:      Routine follow up

## 2017-10-31 ENCOUNTER — PATIENT MESSAGE (OUTPATIENT)
Dept: OBSTETRICS AND GYNECOLOGY | Facility: CLINIC | Age: 37
End: 2017-10-31

## 2017-11-03 ENCOUNTER — OFFICE VISIT (OUTPATIENT)
Dept: OBSTETRICS AND GYNECOLOGY | Facility: CLINIC | Age: 37
End: 2017-11-03
Payer: OTHER GOVERNMENT

## 2017-11-03 VITALS
WEIGHT: 130.06 LBS | BODY MASS INDEX: 22.2 KG/M2 | HEART RATE: 58 BPM | HEIGHT: 64 IN | SYSTOLIC BLOOD PRESSURE: 111 MMHG | DIASTOLIC BLOOD PRESSURE: 77 MMHG

## 2017-11-03 DIAGNOSIS — Z11.3 SCREEN FOR STD (SEXUALLY TRANSMITTED DISEASE): ICD-10-CM

## 2017-11-03 DIAGNOSIS — N89.8 VAGINAL DISCHARGE: ICD-10-CM

## 2017-11-03 DIAGNOSIS — D06.9 CIN III (CERVICAL INTRAEPITHELIAL NEOPLASIA GRADE III) WITH SEVERE DYSPLASIA: Primary | ICD-10-CM

## 2017-11-03 LAB
CANDIDA RRNA VAG QL PROBE: NEGATIVE
G VAGINALIS RRNA GENITAL QL PROBE: POSITIVE
T VAGINALIS RRNA GENITAL QL PROBE: NEGATIVE

## 2017-11-03 PROCEDURE — 87660 TRICHOMONAS VAGIN DIR PROBE: CPT

## 2017-11-03 PROCEDURE — 99212 OFFICE O/P EST SF 10 MIN: CPT | Mod: PBBFAC,PO | Performed by: OBSTETRICS & GYNECOLOGY

## 2017-11-03 PROCEDURE — 88175 CYTOPATH C/V AUTO FLUID REDO: CPT

## 2017-11-03 PROCEDURE — 87591 N.GONORRHOEAE DNA AMP PROB: CPT

## 2017-11-03 PROCEDURE — 99213 OFFICE O/P EST LOW 20 MIN: CPT | Mod: S$PBB,,, | Performed by: OBSTETRICS & GYNECOLOGY

## 2017-11-03 PROCEDURE — 99999 PR PBB SHADOW E&M-EST. PATIENT-LVL II: CPT | Mod: PBBFAC,,, | Performed by: OBSTETRICS & GYNECOLOGY

## 2017-11-03 PROCEDURE — 87480 CANDIDA DNA DIR PROBE: CPT

## 2017-11-04 LAB
C TRACH DNA SPEC QL NAA+PROBE: NOT DETECTED
N GONORRHOEA DNA SPEC QL NAA+PROBE: NOT DETECTED

## 2017-11-06 ENCOUNTER — PATIENT MESSAGE (OUTPATIENT)
Dept: OBSTETRICS AND GYNECOLOGY | Facility: CLINIC | Age: 37
End: 2017-11-06

## 2017-11-06 RX ORDER — METRONIDAZOLE 500 MG/1
500 TABLET ORAL 2 TIMES DAILY
Qty: 10 TABLET | Refills: 1 | Status: SHIPPED | OUTPATIENT
Start: 2017-11-06 | End: 2017-11-11

## 2017-11-06 RX ORDER — METRONIDAZOLE 500 MG/1
500 TABLET ORAL 2 TIMES DAILY
Qty: 10 TABLET | Refills: 1 | Status: SHIPPED | OUTPATIENT
Start: 2017-11-06 | End: 2017-11-06 | Stop reason: SDUPTHER

## 2017-11-06 NOTE — TELEPHONE ENCOUNTER
Patient is requesting the Metronidazole Rx be printed and faxed to the pharmacy. Order is pending.

## 2017-11-06 NOTE — PROGRESS NOTES
SUBJECTIVE:   37 y.o. female  complains of white vaginal discharge for 5 days. No LMP recorded. Patient is not currently having periods (Reason: Birth Control)..  She describes her periods as regular.  She describes other symptoms as none.  She is sexually active.  She uses Depo-Provera for contraception.  She is also here for repeat pap      ROS:  GENERAL: No fever, chills, fatigability or weight loss.  VULVAR: No pain, no lesions and no itching.  VAGINAL: No relaxation, no itching, no discharge, no abnormal bleeding and no lesions.  ABDOMEN: No abdominal pain. Denies nausea. Denies vomiting. No diarrhea. No constipation  BREAST: Denies pain. No lumps. No discharge.  URINARY: No incontinence, no nocturia, no frequency and no dysuria.  CARDIOVASCULAR: No chest pain. No shortness of breath. No leg cramps.  NEUROLOGICAL: No headaches. No vision changes.        Vitals:    17 1440   BP: 111/77   Pulse: (!) 58         OBJECTIVE:   She appears well, afebrile.  Abdomen: benign, soft, nontender, no masses.  VULVA: Normal external female genitalia, normal urethra, normal urethral meatus  VAGINA:discharge: white  CERVIX healing well  UTERUSnot examined  ADNEXAnot evaluated    Urine dipstick: not done.    ASSESSMENT:   nonspecific vaginitis  AMANDA III sp leep      PLAN:   Pap done  Follow up all cultures  Treatment: see orders for additional treatments  ROV prn if symptoms persist or worsen.

## 2017-11-07 ENCOUNTER — PATIENT MESSAGE (OUTPATIENT)
Dept: OBSTETRICS AND GYNECOLOGY | Facility: CLINIC | Age: 37
End: 2017-11-07

## 2018-02-14 ENCOUNTER — TELEPHONE (OUTPATIENT)
Dept: OBSTETRICS AND GYNECOLOGY | Facility: CLINIC | Age: 38
End: 2018-02-14

## 2018-02-14 NOTE — TELEPHONE ENCOUNTER
----- Message from Patricia Mathis sent at 2/14/2018 12:26 PM CST -----  Contact: Pt  Pt is calling and I was able to schedule her an appointment, pt states she's past due for her follow up due to her surgery. Pt's chart kept saying she needs a refferal. Im not sure if I did anything wrong but if so, you can reach out to me to inform me on mistake. Thank you.

## 2018-02-14 NOTE — TELEPHONE ENCOUNTER
Spoke with patient and confirmed her upcoming appt. Also informed her of schedulers comments indicating the need for a referral. Advised her to contact Dr. Kawasaki for a referral. She is very hesitant and feels like she does not need this.

## 2018-03-20 ENCOUNTER — PATIENT MESSAGE (OUTPATIENT)
Dept: FAMILY MEDICINE | Facility: CLINIC | Age: 38
End: 2018-03-20

## 2018-03-21 ENCOUNTER — OFFICE VISIT (OUTPATIENT)
Dept: FAMILY MEDICINE | Facility: CLINIC | Age: 38
End: 2018-03-21
Payer: OTHER GOVERNMENT

## 2018-03-21 VITALS
HEART RATE: 72 BPM | WEIGHT: 132.94 LBS | HEIGHT: 64 IN | BODY MASS INDEX: 22.7 KG/M2 | SYSTOLIC BLOOD PRESSURE: 107 MMHG | DIASTOLIC BLOOD PRESSURE: 59 MMHG

## 2018-03-21 DIAGNOSIS — Z12.4 ENCOUNTER FOR PAP CERVICAL SMEAR FOLLOWING PRIOR ABNORMAL SMEAR: ICD-10-CM

## 2018-03-21 DIAGNOSIS — N93.9 ABNORMAL VAGINAL BLEEDING: Primary | ICD-10-CM

## 2018-03-21 PROCEDURE — 88175 CYTOPATH C/V AUTO FLUID REDO: CPT | Performed by: PATHOLOGY

## 2018-03-21 PROCEDURE — 88141 CYTOPATH C/V INTERPRET: CPT | Mod: ,,, | Performed by: PATHOLOGY

## 2018-03-21 PROCEDURE — 99213 OFFICE O/P EST LOW 20 MIN: CPT | Mod: S$PBB,,, | Performed by: NURSE PRACTITIONER

## 2018-03-21 PROCEDURE — 99999 PR PBB SHADOW E&M-EST. PATIENT-LVL III: CPT | Mod: PBBFAC,,, | Performed by: NURSE PRACTITIONER

## 2018-03-21 PROCEDURE — 99213 OFFICE O/P EST LOW 20 MIN: CPT | Mod: PBBFAC,PO | Performed by: NURSE PRACTITIONER

## 2018-03-22 ENCOUNTER — HOSPITAL ENCOUNTER (OUTPATIENT)
Dept: RADIOLOGY | Facility: HOSPITAL | Age: 38
Discharge: HOME | End: 2018-03-22
Attending: NURSE PRACTITIONER
Payer: COMMERCIAL

## 2018-03-22 DIAGNOSIS — N93.9 ABNORMAL VAGINAL BLEEDING: ICD-10-CM

## 2018-03-22 PROCEDURE — 76856 US EXAM PELVIC COMPLETE: CPT | Mod: 26,,, | Performed by: RADIOLOGY

## 2018-03-22 PROCEDURE — 76830 TRANSVAGINAL US NON-OB: CPT | Mod: TC

## 2018-03-22 PROCEDURE — 76830 TRANSVAGINAL US NON-OB: CPT | Mod: 26,,, | Performed by: RADIOLOGY

## 2018-03-23 ENCOUNTER — TELEPHONE (OUTPATIENT)
Dept: FAMILY MEDICINE | Facility: CLINIC | Age: 38
End: 2018-03-23

## 2018-03-23 NOTE — PROGRESS NOTES
Chief Complaint   Patient presents with    Vaginal Bleeding       History of Present Illness: Gretta Carson is a 37 y.o. female that presents today 3/23/2018 for   Pt presents today for follow-up pap from LEEP procedure done in 2017 (colpo had AMANDA III). 1st repeat pap was normal in 2017. Pt reports that Dr. Pierre told her to have q3 month pap smears until her next annual, which is May 2018. Pt reports that she has been having on and off bleeding since the LEEP procedure. She is on depo injections and states that she never has cycles, so this is unusual for her. She has been on depo injections since . When pt asked about the bleeding it was difficult for her to describe the bleeding. She denies any pain or discomfort associated with the bleeding. No other complaints or concerns noted.      Chief Complaint   Patient presents with    Vaginal Bleeding         Past Medical History:   Diagnosis Date    Abnormal Pap smear of cervix     PTSD (post-traumatic stress disorder)        Past Surgical History:   Procedure Laterality Date    CERVICAL BIOPSY  W/ LOOP ELECTRODE EXCISION      COLPOSCOPY      HERNIA REPAIR         Current Outpatient Prescriptions   Medication Sig Dispense Refill    medroxyPROGESTERone (DEPO-PROVERA) 150 mg/mL injection Inject 150 mg into the muscle every 3 (three) months.       No current facility-administered medications for this visit.        Review of patient's allergies indicates:  No Known Allergies    Family History   Problem Relation Age of Onset    Breast cancer Neg Hx     Miscarriages / Stillbirths Neg Hx        Social History   Substance Use Topics    Smoking status: Never Smoker    Smokeless tobacco: Never Used    Alcohol use No       OB History    Para Term  AB Living   1 1   1       SAB TAB Ectopic Multiple Live Births                  # Outcome Date GA Lbr Eliseo/2nd Weight Sex Delivery Anes PTL Lv   1   26w0d    Vag-Spont        "      Review of Symptoms:  GENERAL: Denies weight gain or weight loss. Feeling well overall.   SKIN: Denies rash or lesions.   HEAD: Denies head injury or headache.   ABDOMEN: No abdominal pain, constipation, diarrhea, nausea, vomiting or rectal bleeding.   URINARY: No frequency, dysuria, hematuria, or burning on urination.    BP (!) 107/59   Pulse 72   Ht 5' 4" (1.626 m)   Wt 60.3 kg (132 lb 15 oz)   Physical Exam:  APPEARANCE: Well nourished, well developed, in no acute distress.  SKIN: Normal skin turgor, no lesions.  RESPIRATORY: Normal respiratory effort with no retractions or use of accessory muscles  PELVIC: Normal external female genitalia without lesions. Normal hair distribution. Vagina moist and well rugated without lesions or discharge. Cervix pink and without lesions. Bimanual exam showed uterus normal size, shape, position, mobile and nontender. Adnexa without masses or tenderness. PAP DONE.    ASSESSMENT/PLAN:  Abnormal vaginal bleeding  -     US Pelvis Comp with Transvag NON-OB (xpd; Future; Expected date: 03/21/2018    Encounter for Pap cervical smear following prior abnormal smear  -     Liquid-based pap smear, screening        Follow-up:  Will follow-up once results are received  RTC as needed    "

## 2018-03-23 NOTE — TELEPHONE ENCOUNTER
Patient informed of results and recommendations as noted by MORENA Grider NP. Patient verbalized understanding.

## 2018-03-23 NOTE — TELEPHONE ENCOUNTER
Please call and inform pt that her pelvic ultrasound was normal - will let her know pap results as soon as they are received.    Thanks

## 2018-03-27 ENCOUNTER — TELEPHONE (OUTPATIENT)
Dept: FAMILY MEDICINE | Facility: CLINIC | Age: 38
End: 2018-03-27

## 2018-03-27 NOTE — TELEPHONE ENCOUNTER
Patient inquiring about US results. Explained results to her again and she verbalized understanding.

## 2018-03-27 NOTE — TELEPHONE ENCOUNTER
----- Message from Carolann Bailey sent at 3/27/2018  1:38 PM CDT -----  Contact: [phone]  Patient is calling to discuss results. Please call patient at [phone]. Thanks!

## 2018-04-02 ENCOUNTER — PATIENT MESSAGE (OUTPATIENT)
Dept: FAMILY MEDICINE | Facility: CLINIC | Age: 38
End: 2018-04-02

## 2018-04-04 ENCOUNTER — TELEPHONE (OUTPATIENT)
Dept: FAMILY MEDICINE | Facility: CLINIC | Age: 38
End: 2018-04-04

## 2018-04-04 ENCOUNTER — PATIENT MESSAGE (OUTPATIENT)
Dept: FAMILY MEDICINE | Facility: CLINIC | Age: 38
End: 2018-04-04

## 2018-04-04 RX ORDER — TINIDAZOLE 500 MG/1
2 TABLET ORAL
Qty: 8 TABLET | Refills: 0 | Status: SHIPPED | OUTPATIENT
Start: 2018-04-04 | End: 2018-04-18 | Stop reason: SDUPTHER

## 2018-04-04 NOTE — TELEPHONE ENCOUNTER
Pt does not have pharmacy in system. Can you find out what pharmacy she uses. I need to send a prescription for tindmax to treat BV.     Thanks

## 2018-04-17 ENCOUNTER — PATIENT MESSAGE (OUTPATIENT)
Dept: FAMILY MEDICINE | Facility: CLINIC | Age: 38
End: 2018-04-17

## 2018-04-18 RX ORDER — TINIDAZOLE 500 MG/1
2 TABLET ORAL
Qty: 8 TABLET | Refills: 0 | Status: SHIPPED | OUTPATIENT
Start: 2018-04-18 | End: 2018-04-20 | Stop reason: SDUPTHER

## 2018-04-20 RX ORDER — TINIDAZOLE 500 MG/1
2 TABLET ORAL
Qty: 8 TABLET | Refills: 0 | Status: SHIPPED | OUTPATIENT
Start: 2018-04-20 | End: 2018-04-22

## 2018-04-26 ENCOUNTER — PATIENT MESSAGE (OUTPATIENT)
Dept: FAMILY MEDICINE | Facility: CLINIC | Age: 38
End: 2018-04-26

## 2018-04-26 NOTE — TELEPHONE ENCOUNTER
----- Message from Sofi Carson sent at 4/26/2018 12:45 PM CDT -----  Contact: Ingrid [facility]/ Dr[facility] Brittni Bella's office  Ingrid with [facility]/ Dr. Brittni Bella's office [phone] calling to speak with nurse in office, states that the patient was told the prescription (she does know the name) was faxed over to the office, they have not received anything. Ingrid states she will send over a release to the office. Fax number is [phone]. Please advise. Thanks.

## 2018-04-26 NOTE — TELEPHONE ENCOUNTER
Left message for Chastity to call the clinic. Spoke with Ms. Oshea and informed her that [facility].

## 2018-04-26 NOTE — TELEPHONE ENCOUNTER
----- Message from Char Hanson sent at 4/26/2018  1:04 PM CDT -----  Contact: [facility] - [phone] is requesting to please fax the prescription to her at [phone]/patient is needing to reschedule an appt for a Friday per [facility]/

## 2021-01-29 ENCOUNTER — LAB VISIT (OUTPATIENT)
Dept: PRIMARY CARE CLINIC | Facility: OTHER | Age: 41
End: 2021-01-29
Attending: INTERNAL MEDICINE
Payer: OTHER GOVERNMENT

## 2021-01-29 DIAGNOSIS — Z20.822 ENCOUNTER FOR LABORATORY TESTING FOR COVID-19 VIRUS: ICD-10-CM

## 2021-01-29 PROCEDURE — U0003 INFECTIOUS AGENT DETECTION BY NUCLEIC ACID (DNA OR RNA); SEVERE ACUTE RESPIRATORY SYNDROME CORONAVIRUS 2 (SARS-COV-2) (CORONAVIRUS DISEASE [COVID-19]), AMPLIFIED PROBE TECHNIQUE, MAKING USE OF HIGH THROUGHPUT TECHNOLOGIES AS DESCRIBED BY CMS-2020-01-R: HCPCS

## 2021-01-31 LAB — SARS-COV-2 RNA RESP QL NAA+PROBE: NOT DETECTED

## 2021-06-14 ENCOUNTER — TELEPHONE (OUTPATIENT)
Dept: OBSTETRICS AND GYNECOLOGY | Facility: CLINIC | Age: 41
End: 2021-06-14

## 2021-06-15 ENCOUNTER — TELEPHONE (OUTPATIENT)
Dept: OBSTETRICS AND GYNECOLOGY | Facility: CLINIC | Age: 41
End: 2021-06-15

## 2021-07-08 ENCOUNTER — OFFICE VISIT (OUTPATIENT)
Dept: OBSTETRICS AND GYNECOLOGY | Facility: CLINIC | Age: 41
End: 2021-07-08
Payer: OTHER GOVERNMENT

## 2021-07-08 ENCOUNTER — LAB VISIT (OUTPATIENT)
Dept: LAB | Facility: HOSPITAL | Age: 41
End: 2021-07-08
Attending: SPECIALIST
Payer: OTHER GOVERNMENT

## 2021-07-08 VITALS
HEIGHT: 64 IN | BODY MASS INDEX: 22.77 KG/M2 | WEIGHT: 133.38 LBS | SYSTOLIC BLOOD PRESSURE: 120 MMHG | DIASTOLIC BLOOD PRESSURE: 80 MMHG

## 2021-07-08 DIAGNOSIS — Z32.00 POSSIBLE PREGNANCY: Primary | ICD-10-CM

## 2021-07-08 DIAGNOSIS — Z01.419 GYNECOLOGIC EXAM NORMAL: ICD-10-CM

## 2021-07-08 DIAGNOSIS — O09.521 MULTIGRAVIDA OF ADVANCED MATERNAL AGE IN FIRST TRIMESTER: ICD-10-CM

## 2021-07-08 DIAGNOSIS — Z32.00 POSSIBLE PREGNANCY: ICD-10-CM

## 2021-07-08 LAB
ABO + RH BLD: NORMAL
B-HCG UR QL: POSITIVE
BASOPHILS # BLD AUTO: 0.04 K/UL (ref 0–0.2)
BASOPHILS NFR BLD: 0.8 % (ref 0–1.9)
BLD GP AB SCN CELLS X3 SERPL QL: NORMAL
CTP QC/QA: YES
DIFFERENTIAL METHOD: ABNORMAL
EOSINOPHIL # BLD AUTO: 0.3 K/UL (ref 0–0.5)
EOSINOPHIL NFR BLD: 5.6 % (ref 0–8)
ERYTHROCYTE [DISTWIDTH] IN BLOOD BY AUTOMATED COUNT: 13.2 % (ref 11.5–14.5)
HCT VFR BLD AUTO: 33.2 % (ref 37–48.5)
HGB BLD-MCNC: 11 G/DL (ref 12–16)
IMM GRANULOCYTES # BLD AUTO: 0.01 K/UL (ref 0–0.04)
IMM GRANULOCYTES NFR BLD AUTO: 0.2 % (ref 0–0.5)
LYMPHOCYTES # BLD AUTO: 1.4 K/UL (ref 1–4.8)
LYMPHOCYTES NFR BLD: 26.3 % (ref 18–48)
MCH RBC QN AUTO: 31.2 PG (ref 27–31)
MCHC RBC AUTO-ENTMCNC: 33.1 G/DL (ref 32–36)
MCV RBC AUTO: 94 FL (ref 82–98)
MISCELLANEOUS GENETIC TEST NAME: NORMAL
MONOCYTES # BLD AUTO: 0.5 K/UL (ref 0.3–1)
MONOCYTES NFR BLD: 8.6 % (ref 4–15)
NEUTROPHILS # BLD AUTO: 3.1 K/UL (ref 1.8–7.7)
NEUTROPHILS NFR BLD: 58.5 % (ref 38–73)
NRBC BLD-RTO: 0 /100 WBC
PLATELET # BLD AUTO: 230 K/UL (ref 150–450)
PMV BLD AUTO: 11.2 FL (ref 9.2–12.9)
RBC # BLD AUTO: 3.53 M/UL (ref 4–5.4)
WBC # BLD AUTO: 5.32 K/UL (ref 3.9–12.7)

## 2021-07-08 PROCEDURE — 76817 TRANSVAGINAL US OBSTETRIC: CPT | Mod: 26,S$PBB,, | Performed by: SPECIALIST

## 2021-07-08 PROCEDURE — 81025 URINE PREGNANCY TEST: CPT | Mod: PBBFAC,PN | Performed by: SPECIALIST

## 2021-07-08 PROCEDURE — 88175 CYTOPATH C/V AUTO FLUID REDO: CPT | Performed by: SPECIALIST

## 2021-07-08 PROCEDURE — 81220 CFTR GENE COM VARIANTS: CPT | Performed by: SPECIALIST

## 2021-07-08 PROCEDURE — 36415 COLL VENOUS BLD VENIPUNCTURE: CPT | Mod: PO | Performed by: SPECIALIST

## 2021-07-08 PROCEDURE — 87340 HEPATITIS B SURFACE AG IA: CPT | Performed by: SPECIALIST

## 2021-07-08 PROCEDURE — 99999 PR PBB SHADOW E&M-EST. PATIENT-LVL III: CPT | Mod: PBBFAC,,, | Performed by: SPECIALIST

## 2021-07-08 PROCEDURE — 86592 SYPHILIS TEST NON-TREP QUAL: CPT | Performed by: SPECIALIST

## 2021-07-08 PROCEDURE — 84443 ASSAY THYROID STIM HORMONE: CPT | Performed by: SPECIALIST

## 2021-07-08 PROCEDURE — 99213 OFFICE O/P EST LOW 20 MIN: CPT | Mod: PBBFAC,PN | Performed by: SPECIALIST

## 2021-07-08 PROCEDURE — 86762 RUBELLA ANTIBODY: CPT | Performed by: SPECIALIST

## 2021-07-08 PROCEDURE — 99999 PR PBB SHADOW E&M-EST. PATIENT-LVL III: ICD-10-PCS | Mod: PBBFAC,,, | Performed by: SPECIALIST

## 2021-07-08 PROCEDURE — 85025 COMPLETE CBC W/AUTO DIFF WBC: CPT | Performed by: SPECIALIST

## 2021-07-08 PROCEDURE — 76817 TRANSVAGINAL US OBSTETRIC: CPT | Mod: PBBFAC,PN | Performed by: SPECIALIST

## 2021-07-08 PROCEDURE — 76817 PR US, OB, TRANSVAG APPROACH: ICD-10-PCS | Mod: 26,S$PBB,, | Performed by: SPECIALIST

## 2021-07-08 PROCEDURE — 0500F INITIAL PRENATAL CARE VISIT: CPT | Mod: ,,, | Performed by: SPECIALIST

## 2021-07-08 PROCEDURE — 86900 BLOOD TYPING SEROLOGIC ABO: CPT | Performed by: SPECIALIST

## 2021-07-08 PROCEDURE — 0500F PR INITIAL PRENATAL CARE VISIT: ICD-10-PCS | Mod: ,,, | Performed by: SPECIALIST

## 2021-07-08 PROCEDURE — 87389 HIV-1 AG W/HIV-1&-2 AB AG IA: CPT | Performed by: SPECIALIST

## 2021-07-08 RX ORDER — NAPROXEN SODIUM 220 MG/1
81 TABLET, FILM COATED ORAL DAILY
Qty: 100 TABLET | Refills: 3 | Status: SHIPPED | OUTPATIENT
Start: 2021-07-08 | End: 2022-07-08

## 2021-07-09 ENCOUNTER — PATIENT MESSAGE (OUTPATIENT)
Dept: OBSTETRICS AND GYNECOLOGY | Facility: CLINIC | Age: 41
End: 2021-07-09

## 2021-07-09 LAB
HBV SURFACE AG SERPL QL IA: NEGATIVE
HIV 1+2 AB+HIV1 P24 AG SERPL QL IA: NEGATIVE
RPR SER QL: NORMAL
RUBV IGG SER-ACNC: 48.3 IU/ML
RUBV IGG SER-IMP: REACTIVE
TSH SERPL DL<=0.005 MIU/L-ACNC: 0.83 UIU/ML (ref 0.4–4)

## 2021-07-12 LAB — CFTR MUT ANL BLD/T: NORMAL

## 2021-07-13 LAB
CLINICAL INFO: NORMAL
CYTO CVX: NORMAL
CYTOLOGIST CVX/VAG CYTO: NORMAL
CYTOLOGY CMNT CVX/VAG CYTO-IMP: NORMAL
CYTOLOGY PAP THIN PREP EXPLANATION: NORMAL
DATE OF PREVIOUS PAP: NO
DATE PREVIOUS BX: NO
LMP START DATE: NORMAL
SPECIMEN SOURCE CVX/VAG CYTO: NORMAL
STAT OF ADQ CVX/VAG CYTO-IMP: NORMAL

## 2021-07-21 ENCOUNTER — PATIENT MESSAGE (OUTPATIENT)
Dept: OBSTETRICS AND GYNECOLOGY | Facility: CLINIC | Age: 41
End: 2021-07-21

## 2021-07-22 ENCOUNTER — PATIENT MESSAGE (OUTPATIENT)
Dept: OBSTETRICS AND GYNECOLOGY | Facility: CLINIC | Age: 41
End: 2021-07-22

## 2021-07-22 DIAGNOSIS — Q91.7: Primary | ICD-10-CM

## 2021-07-22 DIAGNOSIS — O28.9 ABNORMAL FINDING ON ANTENATAL SCREEN: Primary | ICD-10-CM

## 2021-07-26 ENCOUNTER — OFFICE VISIT (OUTPATIENT)
Dept: MATERNAL FETAL MEDICINE | Facility: CLINIC | Age: 41
End: 2021-07-26
Payer: OTHER GOVERNMENT

## 2021-07-26 ENCOUNTER — PROCEDURE VISIT (OUTPATIENT)
Dept: MATERNAL FETAL MEDICINE | Facility: CLINIC | Age: 41
End: 2021-07-26
Payer: OTHER GOVERNMENT

## 2021-07-26 VITALS
DIASTOLIC BLOOD PRESSURE: 62 MMHG | BODY MASS INDEX: 23.27 KG/M2 | SYSTOLIC BLOOD PRESSURE: 110 MMHG | WEIGHT: 135.56 LBS

## 2021-07-26 DIAGNOSIS — O35.9XX0 PREGNANCY AFFECTED BY MULTIPLE CONGENITAL ANOMALIES OF FETUS, SINGLE OR UNSPECIFIED FETUS: ICD-10-CM

## 2021-07-26 DIAGNOSIS — Q91.7: ICD-10-CM

## 2021-07-26 DIAGNOSIS — O28.5 ABNORMAL GENETIC TEST DURING PREGNANCY: ICD-10-CM

## 2021-07-26 DIAGNOSIS — O28.9 ABNORMAL FINDING ON ANTENATAL SCREEN: ICD-10-CM

## 2021-07-26 PROCEDURE — 99999 PR PBB SHADOW E&M-EST. PATIENT-LVL III: ICD-10-PCS | Mod: PBBFAC,,, | Performed by: OBSTETRICS & GYNECOLOGY

## 2021-07-26 PROCEDURE — 99215 PR OFFICE/OUTPT VISIT, EST, LEVL V, 40-54 MIN: ICD-10-PCS | Mod: 25,S$PBB,, | Performed by: OBSTETRICS & GYNECOLOGY

## 2021-07-26 PROCEDURE — 99213 OFFICE O/P EST LOW 20 MIN: CPT | Mod: PBBFAC,25 | Performed by: OBSTETRICS & GYNECOLOGY

## 2021-07-26 PROCEDURE — 99999 PR PBB SHADOW E&M-EST. PATIENT-LVL III: CPT | Mod: PBBFAC,,, | Performed by: OBSTETRICS & GYNECOLOGY

## 2021-07-26 PROCEDURE — 99215 OFFICE O/P EST HI 40 MIN: CPT | Mod: 25,S$PBB,, | Performed by: OBSTETRICS & GYNECOLOGY

## 2021-07-26 PROCEDURE — 76811 OB US DETAILED SNGL FETUS: CPT | Mod: PBBFAC | Performed by: OBSTETRICS & GYNECOLOGY

## 2021-07-26 PROCEDURE — 76811 PR US, OB FETAL EVAL & EXAM, TRANSABDOM,FIRST GESTATION: ICD-10-PCS | Mod: 26,S$PBB,, | Performed by: OBSTETRICS & GYNECOLOGY

## 2021-07-26 PROCEDURE — 76811 OB US DETAILED SNGL FETUS: CPT | Mod: 26,S$PBB,, | Performed by: OBSTETRICS & GYNECOLOGY

## 2021-07-28 ENCOUNTER — PATIENT MESSAGE (OUTPATIENT)
Dept: MATERNAL FETAL MEDICINE | Facility: CLINIC | Age: 41
End: 2021-07-28

## 2021-07-30 ENCOUNTER — ROUTINE PRENATAL (OUTPATIENT)
Dept: OBSTETRICS AND GYNECOLOGY | Facility: CLINIC | Age: 41
End: 2021-07-30
Payer: OTHER GOVERNMENT

## 2021-07-30 VITALS
BODY MASS INDEX: 22.86 KG/M2 | SYSTOLIC BLOOD PRESSURE: 100 MMHG | DIASTOLIC BLOOD PRESSURE: 60 MMHG | WEIGHT: 133.19 LBS

## 2021-07-30 DIAGNOSIS — Q91.7: ICD-10-CM

## 2021-07-30 DIAGNOSIS — O09.521 MULTIGRAVIDA OF ADVANCED MATERNAL AGE IN FIRST TRIMESTER: ICD-10-CM

## 2021-07-30 DIAGNOSIS — Z3A.17 17 WEEKS GESTATION OF PREGNANCY: Primary | ICD-10-CM

## 2021-07-30 LAB
BILIRUB SERPL-MCNC: NORMAL MG/DL
BLOOD URINE, POC: NORMAL
CLARITY, POC UA: CLEAR
COLOR, POC UA: YELLOW
GLUCOSE UR QL STRIP: NORMAL
KETONES UR QL STRIP: NORMAL
LEUKOCYTE ESTERASE URINE, POC: NORMAL
NITRITE, POC UA: NORMAL
PH, POC UA: 8
PROTEIN, POC: NORMAL
SPECIFIC GRAVITY, POC UA: NORMAL
UROBILINOGEN, POC UA: NORMAL

## 2021-07-30 PROCEDURE — 0502F PR SUBSEQUENT PRENATAL CARE: ICD-10-PCS | Mod: ,,, | Performed by: SPECIALIST

## 2021-07-30 PROCEDURE — 99999 PR PBB SHADOW E&M-EST. PATIENT-LVL II: CPT | Mod: PBBFAC,,, | Performed by: SPECIALIST

## 2021-07-30 PROCEDURE — 0502F SUBSEQUENT PRENATAL CARE: CPT | Mod: ,,, | Performed by: SPECIALIST

## 2021-07-30 PROCEDURE — 99212 OFFICE O/P EST SF 10 MIN: CPT | Mod: PBBFAC,PN | Performed by: SPECIALIST

## 2021-07-30 PROCEDURE — 81002 URINALYSIS NONAUTO W/O SCOPE: CPT | Mod: PBBFAC,PN | Performed by: SPECIALIST

## 2021-07-30 PROCEDURE — 99999 PR PBB SHADOW E&M-EST. PATIENT-LVL II: ICD-10-PCS | Mod: PBBFAC,,, | Performed by: SPECIALIST

## 2021-08-02 ENCOUNTER — PATIENT MESSAGE (OUTPATIENT)
Dept: MATERNAL FETAL MEDICINE | Facility: CLINIC | Age: 41
End: 2021-08-02

## 2021-08-04 DIAGNOSIS — O35.9XX0 PREGNANCY AFFECTED BY MULTIPLE CONGENITAL ANOMALIES OF FETUS, SINGLE OR UNSPECIFIED FETUS: Primary | ICD-10-CM

## 2021-08-09 ENCOUNTER — PROCEDURE VISIT (OUTPATIENT)
Dept: MATERNAL FETAL MEDICINE | Facility: CLINIC | Age: 41
End: 2021-08-09
Payer: OTHER GOVERNMENT

## 2021-08-09 ENCOUNTER — OFFICE VISIT (OUTPATIENT)
Dept: MATERNAL FETAL MEDICINE | Facility: CLINIC | Age: 41
End: 2021-08-09
Payer: OTHER GOVERNMENT

## 2021-08-09 VITALS
WEIGHT: 134.25 LBS | DIASTOLIC BLOOD PRESSURE: 64 MMHG | HEIGHT: 64 IN | BODY MASS INDEX: 22.92 KG/M2 | SYSTOLIC BLOOD PRESSURE: 100 MMHG

## 2021-08-09 DIAGNOSIS — O35.9XX0 PREGNANCY AFFECTED BY MULTIPLE CONGENITAL ANOMALIES OF FETUS, SINGLE OR UNSPECIFIED FETUS: ICD-10-CM

## 2021-08-09 DIAGNOSIS — O28.5 ABNORMAL GENETIC TEST DURING PREGNANCY: Primary | ICD-10-CM

## 2021-08-09 PROCEDURE — 76816 OB US FOLLOW-UP PER FETUS: CPT | Mod: 26,S$PBB,, | Performed by: OBSTETRICS & GYNECOLOGY

## 2021-08-09 PROCEDURE — 99999 PR PBB SHADOW E&M-EST. PATIENT-LVL III: ICD-10-PCS | Mod: PBBFAC,,, | Performed by: OBSTETRICS & GYNECOLOGY

## 2021-08-09 PROCEDURE — 99215 PR OFFICE/OUTPT VISIT, EST, LEVL V, 40-54 MIN: ICD-10-PCS | Mod: 25,S$PBB,, | Performed by: OBSTETRICS & GYNECOLOGY

## 2021-08-09 PROCEDURE — 99999 PR PBB SHADOW E&M-EST. PATIENT-LVL III: CPT | Mod: PBBFAC,,, | Performed by: OBSTETRICS & GYNECOLOGY

## 2021-08-09 PROCEDURE — 99213 OFFICE O/P EST LOW 20 MIN: CPT | Mod: PBBFAC | Performed by: OBSTETRICS & GYNECOLOGY

## 2021-08-09 PROCEDURE — 76816 PR  US,PREGNANT UTERUS,F/U,TRANSABD APP: ICD-10-PCS | Mod: 26,S$PBB,, | Performed by: OBSTETRICS & GYNECOLOGY

## 2021-08-09 PROCEDURE — 76816 OB US FOLLOW-UP PER FETUS: CPT | Mod: PBBFAC | Performed by: OBSTETRICS & GYNECOLOGY

## 2021-08-09 PROCEDURE — 99215 OFFICE O/P EST HI 40 MIN: CPT | Mod: 25,S$PBB,, | Performed by: OBSTETRICS & GYNECOLOGY

## 2021-08-12 ENCOUNTER — PATIENT MESSAGE (OUTPATIENT)
Dept: MATERNAL FETAL MEDICINE | Facility: CLINIC | Age: 41
End: 2021-08-12

## 2021-08-26 ENCOUNTER — ROUTINE PRENATAL (OUTPATIENT)
Dept: OBSTETRICS AND GYNECOLOGY | Facility: CLINIC | Age: 41
End: 2021-08-26
Payer: OTHER GOVERNMENT

## 2021-08-26 VITALS — SYSTOLIC BLOOD PRESSURE: 90 MMHG | DIASTOLIC BLOOD PRESSURE: 60 MMHG | BODY MASS INDEX: 23.05 KG/M2 | WEIGHT: 134.25 LBS

## 2021-08-26 DIAGNOSIS — Z3A.21 21 WEEKS GESTATION OF PREGNANCY: Primary | ICD-10-CM

## 2021-08-26 LAB
BILIRUB SERPL-MCNC: NEGATIVE MG/DL
BLOOD URINE, POC: NEGATIVE
CLARITY, POC UA: NORMAL
COLOR, POC UA: NORMAL
GLUCOSE UR QL STRIP: NEGATIVE
KETONES UR QL STRIP: NORMAL
LEUKOCYTE ESTERASE URINE, POC: NORMAL
NITRITE, POC UA: NEGATIVE
PH, POC UA: 5
PROTEIN, POC: NORMAL
SPECIFIC GRAVITY, POC UA: NORMAL
UROBILINOGEN, POC UA: NORMAL

## 2021-08-26 PROCEDURE — 99999 PR PBB SHADOW E&M-EST. PATIENT-LVL II: CPT | Mod: PBBFAC,,, | Performed by: SPECIALIST

## 2021-08-26 PROCEDURE — 81002 URINALYSIS NONAUTO W/O SCOPE: CPT | Mod: PBBFAC,PN | Performed by: SPECIALIST

## 2021-08-26 PROCEDURE — 99999 PR PBB SHADOW E&M-EST. PATIENT-LVL II: ICD-10-PCS | Mod: PBBFAC,,, | Performed by: SPECIALIST

## 2021-08-26 PROCEDURE — 0502F PR SUBSEQUENT PRENATAL CARE: ICD-10-PCS | Mod: ,,, | Performed by: SPECIALIST

## 2021-08-26 PROCEDURE — 99212 OFFICE O/P EST SF 10 MIN: CPT | Mod: PBBFAC,PN | Performed by: SPECIALIST

## 2021-08-26 PROCEDURE — 0502F SUBSEQUENT PRENATAL CARE: CPT | Mod: ,,, | Performed by: SPECIALIST

## 2021-09-03 ENCOUNTER — PATIENT MESSAGE (OUTPATIENT)
Dept: MATERNAL FETAL MEDICINE | Facility: CLINIC | Age: 41
End: 2021-09-03

## 2021-09-08 ENCOUNTER — PROCEDURE VISIT (OUTPATIENT)
Dept: MATERNAL FETAL MEDICINE | Facility: CLINIC | Age: 41
End: 2021-09-08
Payer: OTHER GOVERNMENT

## 2021-09-08 ENCOUNTER — OFFICE VISIT (OUTPATIENT)
Dept: MATERNAL FETAL MEDICINE | Facility: CLINIC | Age: 41
End: 2021-09-08
Payer: OTHER GOVERNMENT

## 2021-09-08 VITALS
HEIGHT: 64 IN | SYSTOLIC BLOOD PRESSURE: 104 MMHG | DIASTOLIC BLOOD PRESSURE: 66 MMHG | WEIGHT: 138 LBS | BODY MASS INDEX: 23.56 KG/M2

## 2021-09-08 DIAGNOSIS — O35.9XX0 PREGNANCY AFFECTED BY MULTIPLE CONGENITAL ANOMALIES OF FETUS, SINGLE OR UNSPECIFIED FETUS: ICD-10-CM

## 2021-09-08 DIAGNOSIS — O28.5 ABNORMAL GENETIC TEST DURING PREGNANCY: Primary | ICD-10-CM

## 2021-09-08 DIAGNOSIS — O28.5 ABNORMAL GENETIC TEST DURING PREGNANCY: ICD-10-CM

## 2021-09-08 PROCEDURE — 59000 PR AMNIOCENTESIS,DIAGNOSTIC: ICD-10-PCS | Mod: S$PBB,,, | Performed by: OBSTETRICS & GYNECOLOGY

## 2021-09-08 PROCEDURE — 76946 ECHO GUIDE FOR AMNIOCENTESIS: CPT | Mod: PBBFAC | Performed by: OBSTETRICS & GYNECOLOGY

## 2021-09-08 PROCEDURE — 76816 PR  US,PREGNANT UTERUS,F/U,TRANSABD APP: ICD-10-PCS | Mod: 26,S$PBB,, | Performed by: OBSTETRICS & GYNECOLOGY

## 2021-09-08 PROCEDURE — 99999 PR PBB SHADOW E&M-EST. PATIENT-LVL III: CPT | Mod: PBBFAC,,, | Performed by: OBSTETRICS & GYNECOLOGY

## 2021-09-08 PROCEDURE — 76946 PR  SO2 GUIDE AMNIOCENTESIS: ICD-10-PCS | Mod: 26,S$PBB,, | Performed by: OBSTETRICS & GYNECOLOGY

## 2021-09-08 PROCEDURE — 99213 OFFICE O/P EST LOW 20 MIN: CPT | Mod: PBBFAC | Performed by: OBSTETRICS & GYNECOLOGY

## 2021-09-08 PROCEDURE — 59000 AMNIOCENTESIS DIAGNOSTIC: CPT | Mod: PBBFAC | Performed by: OBSTETRICS & GYNECOLOGY

## 2021-09-08 PROCEDURE — 99999 PR PBB SHADOW E&M-EST. PATIENT-LVL III: ICD-10-PCS | Mod: PBBFAC,,, | Performed by: OBSTETRICS & GYNECOLOGY

## 2021-09-08 PROCEDURE — 99212 OFFICE O/P EST SF 10 MIN: CPT | Mod: 25,S$PBB,, | Performed by: OBSTETRICS & GYNECOLOGY

## 2021-09-08 PROCEDURE — 76816 OB US FOLLOW-UP PER FETUS: CPT | Mod: PBBFAC | Performed by: OBSTETRICS & GYNECOLOGY

## 2021-09-08 PROCEDURE — 59000 AMNIOCENTESIS DIAGNOSTIC: CPT | Mod: S$PBB,,, | Performed by: OBSTETRICS & GYNECOLOGY

## 2021-09-08 PROCEDURE — 76816 OB US FOLLOW-UP PER FETUS: CPT | Mod: 26,S$PBB,, | Performed by: OBSTETRICS & GYNECOLOGY

## 2021-09-08 PROCEDURE — 99212 PR OFFICE/OUTPT VISIT, EST, LEVL II, 10-19 MIN: ICD-10-PCS | Mod: 25,S$PBB,, | Performed by: OBSTETRICS & GYNECOLOGY

## 2021-09-08 PROCEDURE — 76946 ECHO GUIDE FOR AMNIOCENTESIS: CPT | Mod: 26,S$PBB,, | Performed by: OBSTETRICS & GYNECOLOGY

## 2021-09-13 ENCOUNTER — CLINICAL SUPPORT (OUTPATIENT)
Dept: PEDIATRIC CARDIOLOGY | Facility: CLINIC | Age: 41
End: 2021-09-13
Payer: OTHER GOVERNMENT

## 2021-09-13 ENCOUNTER — OFFICE VISIT (OUTPATIENT)
Dept: PEDIATRIC CARDIOLOGY | Facility: CLINIC | Age: 41
End: 2021-09-13
Payer: OTHER GOVERNMENT

## 2021-09-13 VITALS
WEIGHT: 139.56 LBS | SYSTOLIC BLOOD PRESSURE: 101 MMHG | DIASTOLIC BLOOD PRESSURE: 64 MMHG | HEART RATE: 69 BPM | BODY MASS INDEX: 23.82 KG/M2 | HEIGHT: 64 IN

## 2021-09-13 DIAGNOSIS — O35.BXX0 PREGNANCY COMPLICATED BY FETAL CONGENITAL HEART DISEASE, SINGLE OR UNSPECIFIED FETUS: Primary | ICD-10-CM

## 2021-09-13 DIAGNOSIS — O35.9XX0 PREGNANCY AFFECTED BY MULTIPLE CONGENITAL ANOMALIES OF FETUS, SINGLE OR UNSPECIFIED FETUS: ICD-10-CM

## 2021-09-13 DIAGNOSIS — O28.5 ABNORMAL GENETIC TEST DURING PREGNANCY: ICD-10-CM

## 2021-09-13 PROCEDURE — 93325 DOPPLER ECHO COLOR FLOW MAPG: CPT | Mod: PBBFAC,PO | Performed by: PEDIATRICS

## 2021-09-13 PROCEDURE — 99211 OFF/OP EST MAY X REQ PHY/QHP: CPT | Mod: PBBFAC,PO,25

## 2021-09-13 PROCEDURE — 99999 PR PBB SHADOW E&M-EST. PATIENT-LVL I: CPT | Mod: PBBFAC,,,

## 2021-09-13 PROCEDURE — 76825 ECHO EXAM OF FETAL HEART: CPT | Mod: 26,S$PBB,, | Performed by: PEDIATRICS

## 2021-09-13 PROCEDURE — 99205 PR OFFICE/OUTPT VISIT, NEW, LEVL V, 60-74 MIN: ICD-10-PCS | Mod: 25,S$PBB,, | Performed by: PEDIATRICS

## 2021-09-13 PROCEDURE — 99999 PR PBB SHADOW E&M-EST. PATIENT-LVL III: ICD-10-PCS | Mod: PBBFAC,,, | Performed by: PEDIATRICS

## 2021-09-13 PROCEDURE — 93325 DOPPLER ECHO COLOR FLOW MAPG: CPT | Mod: 26,S$PBB,, | Performed by: PEDIATRICS

## 2021-09-13 PROCEDURE — 76827 ECHO EXAM OF FETAL HEART: CPT | Mod: PBBFAC,PO | Performed by: PEDIATRICS

## 2021-09-13 PROCEDURE — 99999 PR PBB SHADOW E&M-EST. PATIENT-LVL I: ICD-10-PCS | Mod: PBBFAC,,,

## 2021-09-13 PROCEDURE — 99205 OFFICE O/P NEW HI 60 MIN: CPT | Mod: 25,S$PBB,, | Performed by: PEDIATRICS

## 2021-09-13 PROCEDURE — 76827 PR  SO2 FETAL HEART DOPPLER: ICD-10-PCS | Mod: 26,S$PBB,, | Performed by: PEDIATRICS

## 2021-09-13 PROCEDURE — 99999 PR PBB SHADOW E&M-EST. PATIENT-LVL III: CPT | Mod: PBBFAC,,, | Performed by: PEDIATRICS

## 2021-09-13 PROCEDURE — 76827 ECHO EXAM OF FETAL HEART: CPT | Mod: 26,S$PBB,, | Performed by: PEDIATRICS

## 2021-09-13 PROCEDURE — 99213 OFFICE O/P EST LOW 20 MIN: CPT | Mod: PBBFAC,27,PO,25 | Performed by: PEDIATRICS

## 2021-09-13 PROCEDURE — 76825 ECHO EXAM OF FETAL HEART: CPT | Mod: PBBFAC,PO | Performed by: PEDIATRICS

## 2021-09-13 PROCEDURE — 76825 PR  SO2 FETAL HEART: ICD-10-PCS | Mod: 26,S$PBB,, | Performed by: PEDIATRICS

## 2021-09-13 PROCEDURE — 93325 PR DOPPLER COLOR FLOW VELOCITY MAP: ICD-10-PCS | Mod: 26,S$PBB,, | Performed by: PEDIATRICS

## 2021-09-15 ENCOUNTER — ROUTINE PRENATAL (OUTPATIENT)
Dept: OBSTETRICS AND GYNECOLOGY | Facility: CLINIC | Age: 41
End: 2021-09-15
Payer: OTHER GOVERNMENT

## 2021-09-15 VITALS — BODY MASS INDEX: 23.87 KG/M2 | DIASTOLIC BLOOD PRESSURE: 60 MMHG | WEIGHT: 139.13 LBS | SYSTOLIC BLOOD PRESSURE: 98 MMHG

## 2021-09-15 DIAGNOSIS — Z3A.23 23 WEEKS GESTATION OF PREGNANCY: Primary | ICD-10-CM

## 2021-09-15 LAB
BILIRUB SERPL-MCNC: NORMAL MG/DL
BLOOD URINE, POC: NORMAL
CLARITY, POC UA: CLEAR
COLOR, POC UA: YELLOW
GLUCOSE UR QL STRIP: NORMAL
KETONES UR QL STRIP: NORMAL
LEUKOCYTE ESTERASE URINE, POC: NORMAL
NITRITE, POC UA: NORMAL
PH, POC UA: 7
PROTEIN, POC: NORMAL
SPECIFIC GRAVITY, POC UA: NORMAL
UROBILINOGEN, POC UA: NORMAL

## 2021-09-15 PROCEDURE — 0502F PR SUBSEQUENT PRENATAL CARE: ICD-10-PCS | Mod: ,,, | Performed by: SPECIALIST

## 2021-09-15 PROCEDURE — 0502F SUBSEQUENT PRENATAL CARE: CPT | Mod: ,,, | Performed by: SPECIALIST

## 2021-09-15 PROCEDURE — 99212 OFFICE O/P EST SF 10 MIN: CPT | Mod: PBBFAC,PN | Performed by: SPECIALIST

## 2021-09-15 PROCEDURE — 99999 PR PBB SHADOW E&M-EST. PATIENT-LVL II: CPT | Mod: PBBFAC,,, | Performed by: SPECIALIST

## 2021-09-15 PROCEDURE — 99999 PR PBB SHADOW E&M-EST. PATIENT-LVL II: ICD-10-PCS | Mod: PBBFAC,,, | Performed by: SPECIALIST

## 2021-09-15 PROCEDURE — 81002 URINALYSIS NONAUTO W/O SCOPE: CPT | Mod: PBBFAC,PN | Performed by: SPECIALIST

## 2021-09-17 ENCOUNTER — TELEPHONE (OUTPATIENT)
Dept: MATERNAL FETAL MEDICINE | Facility: CLINIC | Age: 41
End: 2021-09-17

## 2021-09-21 ENCOUNTER — DOCUMENTATION ONLY (OUTPATIENT)
Dept: MATERNAL FETAL MEDICINE | Facility: CLINIC | Age: 41
End: 2021-09-21

## 2021-09-23 ENCOUNTER — PATIENT MESSAGE (OUTPATIENT)
Dept: ADMINISTRATIVE | Facility: OTHER | Age: 41
End: 2021-09-23

## 2021-10-04 ENCOUNTER — PATIENT MESSAGE (OUTPATIENT)
Dept: MATERNAL FETAL MEDICINE | Facility: CLINIC | Age: 41
End: 2021-10-04

## 2021-10-06 ENCOUNTER — OFFICE VISIT (OUTPATIENT)
Dept: PEDIATRIC CARDIOLOGY | Facility: CLINIC | Age: 41
End: 2021-10-06
Payer: OTHER GOVERNMENT

## 2021-10-06 ENCOUNTER — OFFICE VISIT (OUTPATIENT)
Dept: MATERNAL FETAL MEDICINE | Facility: CLINIC | Age: 41
End: 2021-10-06
Payer: OTHER GOVERNMENT

## 2021-10-06 ENCOUNTER — CLINICAL SUPPORT (OUTPATIENT)
Dept: PEDIATRIC CARDIOLOGY | Facility: CLINIC | Age: 41
End: 2021-10-06
Payer: OTHER GOVERNMENT

## 2021-10-06 ENCOUNTER — PROCEDURE VISIT (OUTPATIENT)
Dept: MATERNAL FETAL MEDICINE | Facility: CLINIC | Age: 41
End: 2021-10-06
Payer: OTHER GOVERNMENT

## 2021-10-06 VITALS
WEIGHT: 142.44 LBS | DIASTOLIC BLOOD PRESSURE: 74 MMHG | BODY MASS INDEX: 24.43 KG/M2 | SYSTOLIC BLOOD PRESSURE: 116 MMHG

## 2021-10-06 VITALS
WEIGHT: 142.44 LBS | SYSTOLIC BLOOD PRESSURE: 116 MMHG | DIASTOLIC BLOOD PRESSURE: 74 MMHG | HEIGHT: 64 IN | BODY MASS INDEX: 24.32 KG/M2

## 2021-10-06 DIAGNOSIS — O28.5 ABNORMAL GENETIC TEST DURING PREGNANCY: ICD-10-CM

## 2021-10-06 DIAGNOSIS — O35.9XX0 PREGNANCY AFFECTED BY MULTIPLE CONGENITAL ANOMALIES OF FETUS, SINGLE OR UNSPECIFIED FETUS: Primary | ICD-10-CM

## 2021-10-06 DIAGNOSIS — O35.19X0: ICD-10-CM

## 2021-10-06 DIAGNOSIS — O35.19X0: Primary | ICD-10-CM

## 2021-10-06 DIAGNOSIS — O35.9XX0 PREGNANCY AFFECTED BY MULTIPLE CONGENITAL ANOMALIES OF FETUS, SINGLE OR UNSPECIFIED FETUS: ICD-10-CM

## 2021-10-06 DIAGNOSIS — Z36.89 ENCOUNTER FOR ULTRASOUND TO ASSESS FETAL GROWTH: Primary | ICD-10-CM

## 2021-10-06 PROCEDURE — 93325 DOPPLER ECHO COLOR FLOW MAPG: CPT | Mod: PBBFAC | Performed by: PEDIATRICS

## 2021-10-06 PROCEDURE — 99214 PR OFFICE/OUTPT VISIT, EST, LEVL IV, 30-39 MIN: ICD-10-PCS | Mod: 25,S$PBB,, | Performed by: OBSTETRICS & GYNECOLOGY

## 2021-10-06 PROCEDURE — 76828 ECHO EXAM OF FETAL HEART: CPT | Mod: PBBFAC | Performed by: PEDIATRICS

## 2021-10-06 PROCEDURE — 93325 PR DOPPLER COLOR FLOW VELOCITY MAP: ICD-10-PCS | Mod: 26,S$PBB,, | Performed by: PEDIATRICS

## 2021-10-06 PROCEDURE — 76816 OB US FOLLOW-UP PER FETUS: CPT | Mod: PBBFAC | Performed by: OBSTETRICS & GYNECOLOGY

## 2021-10-06 PROCEDURE — 99999 PR PBB SHADOW E&M-EST. PATIENT-LVL II: CPT | Mod: PBBFAC,,, | Performed by: OBSTETRICS & GYNECOLOGY

## 2021-10-06 PROCEDURE — 99999 PR PBB SHADOW E&M-EST. PATIENT-LVL III: ICD-10-PCS | Mod: PBBFAC,,, | Performed by: PEDIATRICS

## 2021-10-06 PROCEDURE — 99212 OFFICE O/P EST SF 10 MIN: CPT | Mod: PBBFAC | Performed by: OBSTETRICS & GYNECOLOGY

## 2021-10-06 PROCEDURE — 99215 PR OFFICE/OUTPT VISIT, EST, LEVL V, 40-54 MIN: ICD-10-PCS | Mod: 25,S$PBB,, | Performed by: PEDIATRICS

## 2021-10-06 PROCEDURE — 99214 OFFICE O/P EST MOD 30 MIN: CPT | Mod: 25,S$PBB,, | Performed by: OBSTETRICS & GYNECOLOGY

## 2021-10-06 PROCEDURE — 76826 ECHO EXAM OF FETAL HEART: CPT | Mod: 26,S$PBB,, | Performed by: PEDIATRICS

## 2021-10-06 PROCEDURE — 76816 OB US FOLLOW-UP PER FETUS: CPT | Mod: 26,S$PBB,, | Performed by: OBSTETRICS & GYNECOLOGY

## 2021-10-06 PROCEDURE — 93321 DOPPLER ECHO F-UP/LMTD STD: CPT | Mod: PBBFAC | Performed by: PEDIATRICS

## 2021-10-06 PROCEDURE — 76828 ECHO EXAM OF FETAL HEART: CPT | Mod: 26,S$PBB,, | Performed by: PEDIATRICS

## 2021-10-06 PROCEDURE — 76816 PR  US,PREGNANT UTERUS,F/U,TRANSABD APP: ICD-10-PCS | Mod: 26,S$PBB,, | Performed by: OBSTETRICS & GYNECOLOGY

## 2021-10-06 PROCEDURE — 76826 PR ECHO 2D W/WO M-MODE, FETAL, F/UP: ICD-10-PCS | Mod: 26,S$PBB,, | Performed by: PEDIATRICS

## 2021-10-06 PROCEDURE — 99213 OFFICE O/P EST LOW 20 MIN: CPT | Mod: PBBFAC,27 | Performed by: PEDIATRICS

## 2021-10-06 PROCEDURE — 99999 PR PBB SHADOW E&M-EST. PATIENT-LVL III: CPT | Mod: PBBFAC,,, | Performed by: PEDIATRICS

## 2021-10-06 PROCEDURE — 99999 PR PBB SHADOW E&M-EST. PATIENT-LVL II: ICD-10-PCS | Mod: PBBFAC,,, | Performed by: OBSTETRICS & GYNECOLOGY

## 2021-10-06 PROCEDURE — 93325 DOPPLER ECHO COLOR FLOW MAPG: CPT | Mod: 26,S$PBB,, | Performed by: PEDIATRICS

## 2021-10-06 PROCEDURE — 99215 OFFICE O/P EST HI 40 MIN: CPT | Mod: 25,S$PBB,, | Performed by: PEDIATRICS

## 2021-10-06 PROCEDURE — 76828 PR  SO2 FETAL HRT DOPPL F/U: ICD-10-PCS | Mod: 26,S$PBB,, | Performed by: PEDIATRICS

## 2021-10-13 ENCOUNTER — ROUTINE PRENATAL (OUTPATIENT)
Dept: OBSTETRICS AND GYNECOLOGY | Facility: CLINIC | Age: 41
End: 2021-10-13
Payer: OTHER GOVERNMENT

## 2021-10-13 VITALS
DIASTOLIC BLOOD PRESSURE: 72 MMHG | BODY MASS INDEX: 25.04 KG/M2 | WEIGHT: 145.94 LBS | SYSTOLIC BLOOD PRESSURE: 114 MMHG

## 2021-10-13 DIAGNOSIS — Z3A.27 27 WEEKS GESTATION OF PREGNANCY: Primary | ICD-10-CM

## 2021-10-13 PROCEDURE — 99999 PR PBB SHADOW E&M-EST. PATIENT-LVL II: CPT | Mod: PBBFAC,,, | Performed by: SPECIALIST

## 2021-10-13 PROCEDURE — 99212 OFFICE O/P EST SF 10 MIN: CPT | Mod: PBBFAC,PN | Performed by: SPECIALIST

## 2021-10-13 PROCEDURE — 99999 PR PBB SHADOW E&M-EST. PATIENT-LVL II: ICD-10-PCS | Mod: PBBFAC,,, | Performed by: SPECIALIST

## 2021-10-13 PROCEDURE — 0502F SUBSEQUENT PRENATAL CARE: CPT | Mod: ,,, | Performed by: SPECIALIST

## 2021-10-13 PROCEDURE — 0502F PR SUBSEQUENT PRENATAL CARE: ICD-10-PCS | Mod: ,,, | Performed by: SPECIALIST

## 2021-10-23 ENCOUNTER — PATIENT MESSAGE (OUTPATIENT)
Dept: OBSTETRICS AND GYNECOLOGY | Facility: CLINIC | Age: 41
End: 2021-10-23
Payer: OTHER GOVERNMENT

## 2021-10-26 ENCOUNTER — ROUTINE PRENATAL (OUTPATIENT)
Dept: OBSTETRICS AND GYNECOLOGY | Facility: CLINIC | Age: 41
End: 2021-10-26
Payer: OTHER GOVERNMENT

## 2021-10-26 VITALS
SYSTOLIC BLOOD PRESSURE: 102 MMHG | DIASTOLIC BLOOD PRESSURE: 66 MMHG | BODY MASS INDEX: 25.49 KG/M2 | WEIGHT: 148.56 LBS

## 2021-10-26 DIAGNOSIS — Z3A.29 29 WEEKS GESTATION OF PREGNANCY: Primary | ICD-10-CM

## 2021-10-26 LAB
BILIRUB SERPL-MCNC: NORMAL MG/DL
BLOOD URINE, POC: NORMAL
CLARITY, POC UA: NORMAL
COLOR, POC UA: NORMAL
GLUCOSE UR QL STRIP: NORMAL
KETONES UR QL STRIP: NORMAL
LEUKOCYTE ESTERASE URINE, POC: NORMAL
NITRITE, POC UA: NORMAL
PH, POC UA: NORMAL
PROTEIN, POC: NORMAL
SPECIFIC GRAVITY, POC UA: NORMAL
UROBILINOGEN, POC UA: NORMAL

## 2021-10-26 PROCEDURE — 81002 URINALYSIS NONAUTO W/O SCOPE: CPT | Mod: PBBFAC,PN | Performed by: SPECIALIST

## 2021-10-26 PROCEDURE — 99999 PR PBB SHADOW E&M-EST. PATIENT-LVL II: CPT | Mod: PBBFAC,,, | Performed by: SPECIALIST

## 2021-10-26 PROCEDURE — 99999 PR PBB SHADOW E&M-EST. PATIENT-LVL II: ICD-10-PCS | Mod: PBBFAC,,, | Performed by: SPECIALIST

## 2021-10-26 PROCEDURE — 0502F SUBSEQUENT PRENATAL CARE: CPT | Mod: ,,, | Performed by: SPECIALIST

## 2021-10-26 PROCEDURE — 0502F PR SUBSEQUENT PRENATAL CARE: ICD-10-PCS | Mod: ,,, | Performed by: SPECIALIST

## 2021-10-26 PROCEDURE — 99212 OFFICE O/P EST SF 10 MIN: CPT | Mod: PBBFAC,PN | Performed by: SPECIALIST

## 2021-11-01 ENCOUNTER — CLINICAL SUPPORT (OUTPATIENT)
Dept: PEDIATRIC CARDIOLOGY | Facility: CLINIC | Age: 41
End: 2021-11-01
Payer: OTHER GOVERNMENT

## 2021-11-01 ENCOUNTER — OFFICE VISIT (OUTPATIENT)
Dept: PEDIATRIC CARDIOLOGY | Facility: CLINIC | Age: 41
End: 2021-11-01
Payer: OTHER GOVERNMENT

## 2021-11-01 VITALS
HEART RATE: 77 BPM | HEIGHT: 64 IN | SYSTOLIC BLOOD PRESSURE: 119 MMHG | WEIGHT: 149.81 LBS | BODY MASS INDEX: 25.57 KG/M2 | DIASTOLIC BLOOD PRESSURE: 71 MMHG

## 2021-11-01 DIAGNOSIS — O35.9XX0 PREGNANCY AFFECTED BY MULTIPLE CONGENITAL ANOMALIES OF FETUS, SINGLE OR UNSPECIFIED FETUS: ICD-10-CM

## 2021-11-01 DIAGNOSIS — O35.19X0: ICD-10-CM

## 2021-11-01 DIAGNOSIS — O35.19X0: Primary | ICD-10-CM

## 2021-11-01 DIAGNOSIS — O35.9XX0 PREGNANCY AFFECTED BY MULTIPLE CONGENITAL ANOMALIES OF FETUS, SINGLE OR UNSPECIFIED FETUS: Primary | ICD-10-CM

## 2021-11-01 PROCEDURE — 76828 PR  SO2 FETAL HRT DOPPL F/U: ICD-10-PCS | Mod: 26,S$PBB,, | Performed by: PEDIATRICS

## 2021-11-01 PROCEDURE — 76826 PR ECHO 2D W/WO M-MODE, FETAL, F/UP: ICD-10-PCS | Mod: 26,S$PBB,, | Performed by: PEDIATRICS

## 2021-11-01 PROCEDURE — 93325 PR DOPPLER COLOR FLOW VELOCITY MAP: ICD-10-PCS | Mod: 26,S$PBB,, | Performed by: PEDIATRICS

## 2021-11-01 PROCEDURE — 76828 ECHO EXAM OF FETAL HEART: CPT | Mod: 26,S$PBB,, | Performed by: PEDIATRICS

## 2021-11-01 PROCEDURE — 99999 PR PBB SHADOW E&M-EST. PATIENT-LVL I: CPT | Mod: PBBFAC,,,

## 2021-11-01 PROCEDURE — 99999 PR PBB SHADOW E&M-EST. PATIENT-LVL III: CPT | Mod: PBBFAC,,, | Performed by: PEDIATRICS

## 2021-11-01 PROCEDURE — 99214 OFFICE O/P EST MOD 30 MIN: CPT | Mod: 25,S$PBB,, | Performed by: PEDIATRICS

## 2021-11-01 PROCEDURE — 76826 ECHO EXAM OF FETAL HEART: CPT | Mod: 26,S$PBB,, | Performed by: PEDIATRICS

## 2021-11-01 PROCEDURE — 99999 PR PBB SHADOW E&M-EST. PATIENT-LVL III: ICD-10-PCS | Mod: PBBFAC,,, | Performed by: PEDIATRICS

## 2021-11-01 PROCEDURE — 99213 OFFICE O/P EST LOW 20 MIN: CPT | Mod: PBBFAC,27,PO | Performed by: PEDIATRICS

## 2021-11-01 PROCEDURE — 93325 DOPPLER ECHO COLOR FLOW MAPG: CPT | Mod: PBBFAC,PO | Performed by: PEDIATRICS

## 2021-11-01 PROCEDURE — 76826 ECHO EXAM OF FETAL HEART: CPT | Mod: PBBFAC,PO | Performed by: PEDIATRICS

## 2021-11-01 PROCEDURE — 99211 OFF/OP EST MAY X REQ PHY/QHP: CPT | Mod: PBBFAC,PO

## 2021-11-01 PROCEDURE — 99999 PR PBB SHADOW E&M-EST. PATIENT-LVL I: ICD-10-PCS | Mod: PBBFAC,,,

## 2021-11-01 PROCEDURE — 76825 ECHO EXAM OF FETAL HEART: CPT | Mod: PBBFAC,PO | Performed by: PEDIATRICS

## 2021-11-01 PROCEDURE — 93325 DOPPLER ECHO COLOR FLOW MAPG: CPT | Mod: 26,S$PBB,, | Performed by: PEDIATRICS

## 2021-11-01 PROCEDURE — 99214 PR OFFICE/OUTPT VISIT, EST, LEVL IV, 30-39 MIN: ICD-10-PCS | Mod: 25,S$PBB,, | Performed by: PEDIATRICS

## 2021-11-09 ENCOUNTER — ROUTINE PRENATAL (OUTPATIENT)
Dept: OBSTETRICS AND GYNECOLOGY | Facility: CLINIC | Age: 41
End: 2021-11-09
Payer: OTHER GOVERNMENT

## 2021-11-09 VITALS
DIASTOLIC BLOOD PRESSURE: 68 MMHG | WEIGHT: 152.31 LBS | BODY MASS INDEX: 26.14 KG/M2 | SYSTOLIC BLOOD PRESSURE: 116 MMHG

## 2021-11-09 DIAGNOSIS — Z3A.31 31 WEEKS GESTATION OF PREGNANCY: Primary | ICD-10-CM

## 2021-11-09 LAB
BILIRUB SERPL-MCNC: NORMAL MG/DL
BLOOD URINE, POC: NORMAL
CLARITY, POC UA: CLEAR
COLOR, POC UA: YELLOW
GLUCOSE UR QL STRIP: NORMAL
KETONES UR QL STRIP: NORMAL
LEUKOCYTE ESTERASE URINE, POC: NORMAL
NITRITE, POC UA: NORMAL
PH, POC UA: 5
PROTEIN, POC: NORMAL
SPECIFIC GRAVITY, POC UA: NORMAL
UROBILINOGEN, POC UA: NORMAL

## 2021-11-09 PROCEDURE — 81002 URINALYSIS NONAUTO W/O SCOPE: CPT | Mod: PBBFAC,PN | Performed by: SPECIALIST

## 2021-11-09 PROCEDURE — 0502F SUBSEQUENT PRENATAL CARE: CPT | Mod: ,,, | Performed by: SPECIALIST

## 2021-11-09 PROCEDURE — 0502F PR SUBSEQUENT PRENATAL CARE: ICD-10-PCS | Mod: ,,, | Performed by: SPECIALIST

## 2021-11-09 PROCEDURE — 99999 PR PBB SHADOW E&M-EST. PATIENT-LVL II: ICD-10-PCS | Mod: PBBFAC,,, | Performed by: SPECIALIST

## 2021-11-09 PROCEDURE — 99999 PR PBB SHADOW E&M-EST. PATIENT-LVL II: CPT | Mod: PBBFAC,,, | Performed by: SPECIALIST

## 2021-11-09 PROCEDURE — 99212 OFFICE O/P EST SF 10 MIN: CPT | Mod: PBBFAC,PN | Performed by: SPECIALIST

## 2021-11-11 ENCOUNTER — PATIENT MESSAGE (OUTPATIENT)
Dept: ADMINISTRATIVE | Facility: OTHER | Age: 41
End: 2021-11-11
Payer: OTHER GOVERNMENT

## 2021-11-16 ENCOUNTER — PATIENT MESSAGE (OUTPATIENT)
Dept: MATERNAL FETAL MEDICINE | Facility: CLINIC | Age: 41
End: 2021-11-16
Payer: OTHER GOVERNMENT

## 2021-11-17 ENCOUNTER — PROCEDURE VISIT (OUTPATIENT)
Dept: MATERNAL FETAL MEDICINE | Facility: CLINIC | Age: 41
End: 2021-11-17
Payer: OTHER GOVERNMENT

## 2021-11-17 ENCOUNTER — OFFICE VISIT (OUTPATIENT)
Dept: MATERNAL FETAL MEDICINE | Facility: CLINIC | Age: 41
End: 2021-11-17
Payer: OTHER GOVERNMENT

## 2021-11-17 VITALS
SYSTOLIC BLOOD PRESSURE: 110 MMHG | BODY MASS INDEX: 26.63 KG/M2 | WEIGHT: 155.19 LBS | DIASTOLIC BLOOD PRESSURE: 70 MMHG

## 2021-11-17 DIAGNOSIS — Z36.89 ENCOUNTER FOR ULTRASOUND TO ASSESS FETAL GROWTH: ICD-10-CM

## 2021-11-17 DIAGNOSIS — O35.9XX0 PREGNANCY AFFECTED BY MULTIPLE CONGENITAL ANOMALIES OF FETUS, SINGLE OR UNSPECIFIED FETUS: ICD-10-CM

## 2021-11-17 DIAGNOSIS — O35.19X0: ICD-10-CM

## 2021-11-17 PROCEDURE — 76816 PR  US,PREGNANT UTERUS,F/U,TRANSABD APP: ICD-10-PCS | Mod: 26,S$PBB,, | Performed by: OBSTETRICS & GYNECOLOGY

## 2021-11-17 PROCEDURE — 76816 OB US FOLLOW-UP PER FETUS: CPT | Mod: PBBFAC | Performed by: OBSTETRICS & GYNECOLOGY

## 2021-11-17 PROCEDURE — 76816 OB US FOLLOW-UP PER FETUS: CPT | Mod: 26,S$PBB,, | Performed by: OBSTETRICS & GYNECOLOGY

## 2021-11-17 PROCEDURE — 99214 PR OFFICE/OUTPT VISIT, EST, LEVL IV, 30-39 MIN: ICD-10-PCS | Mod: 25,S$PBB,, | Performed by: OBSTETRICS & GYNECOLOGY

## 2021-11-17 PROCEDURE — 99212 OFFICE O/P EST SF 10 MIN: CPT | Mod: PBBFAC | Performed by: OBSTETRICS & GYNECOLOGY

## 2021-11-17 PROCEDURE — 99999 PR PBB SHADOW E&M-EST. PATIENT-LVL II: ICD-10-PCS | Mod: PBBFAC,,, | Performed by: OBSTETRICS & GYNECOLOGY

## 2021-11-17 PROCEDURE — 99999 PR PBB SHADOW E&M-EST. PATIENT-LVL II: CPT | Mod: PBBFAC,,, | Performed by: OBSTETRICS & GYNECOLOGY

## 2021-11-17 PROCEDURE — 99214 OFFICE O/P EST MOD 30 MIN: CPT | Mod: 25,S$PBB,, | Performed by: OBSTETRICS & GYNECOLOGY

## 2021-11-23 ENCOUNTER — ROUTINE PRENATAL (OUTPATIENT)
Dept: OBSTETRICS AND GYNECOLOGY | Facility: CLINIC | Age: 41
End: 2021-11-23
Payer: OTHER GOVERNMENT

## 2021-11-23 VITALS
SYSTOLIC BLOOD PRESSURE: 116 MMHG | WEIGHT: 156.75 LBS | BODY MASS INDEX: 26.89 KG/M2 | DIASTOLIC BLOOD PRESSURE: 80 MMHG

## 2021-11-23 DIAGNOSIS — Z3A.33 33 WEEKS GESTATION OF PREGNANCY: Primary | ICD-10-CM

## 2021-11-23 LAB
BILIRUB SERPL-MCNC: NORMAL MG/DL
BLOOD URINE, POC: NORMAL
CLARITY, POC UA: CLEAR
COLOR, POC UA: NORMAL
GLUCOSE UR QL STRIP: NORMAL
KETONES UR QL STRIP: NORMAL
LEUKOCYTE ESTERASE URINE, POC: NORMAL
NITRITE, POC UA: NORMAL
PH, POC UA: 7
PROTEIN, POC: NORMAL
SPECIFIC GRAVITY, POC UA: NORMAL
UROBILINOGEN, POC UA: NORMAL

## 2021-11-23 PROCEDURE — 0502F PR SUBSEQUENT PRENATAL CARE: ICD-10-PCS | Mod: ,,, | Performed by: SPECIALIST

## 2021-11-23 PROCEDURE — 99999 PR PBB SHADOW E&M-EST. PATIENT-LVL II: ICD-10-PCS | Mod: PBBFAC,,, | Performed by: SPECIALIST

## 2021-11-23 PROCEDURE — 99999 PR PBB SHADOW E&M-EST. PATIENT-LVL II: CPT | Mod: PBBFAC,,, | Performed by: SPECIALIST

## 2021-11-23 PROCEDURE — 0502F SUBSEQUENT PRENATAL CARE: CPT | Mod: ,,, | Performed by: SPECIALIST

## 2021-11-23 PROCEDURE — 81002 URINALYSIS NONAUTO W/O SCOPE: CPT | Mod: PBBFAC,PN | Performed by: SPECIALIST

## 2021-11-23 PROCEDURE — 99212 OFFICE O/P EST SF 10 MIN: CPT | Mod: PBBFAC,PN | Performed by: SPECIALIST

## 2021-12-07 ENCOUNTER — PATIENT MESSAGE (OUTPATIENT)
Dept: MATERNAL FETAL MEDICINE | Facility: CLINIC | Age: 41
End: 2021-12-07
Payer: OTHER GOVERNMENT

## 2021-12-08 ENCOUNTER — OFFICE VISIT (OUTPATIENT)
Dept: MATERNAL FETAL MEDICINE | Facility: CLINIC | Age: 41
End: 2021-12-08
Payer: OTHER GOVERNMENT

## 2021-12-08 VITALS
WEIGHT: 161.38 LBS | SYSTOLIC BLOOD PRESSURE: 116 MMHG | DIASTOLIC BLOOD PRESSURE: 72 MMHG | BODY MASS INDEX: 27.69 KG/M2

## 2021-12-08 DIAGNOSIS — O35.9XX0 PREGNANCY AFFECTED BY MULTIPLE CONGENITAL ANOMALIES OF FETUS, SINGLE OR UNSPECIFIED FETUS: ICD-10-CM

## 2021-12-08 DIAGNOSIS — O35.19X0: Primary | ICD-10-CM

## 2021-12-08 PROCEDURE — 99999 PR PBB SHADOW E&M-EST. PATIENT-LVL II: CPT | Mod: PBBFAC,,, | Performed by: OBSTETRICS & GYNECOLOGY

## 2021-12-08 PROCEDURE — 99213 PR OFFICE/OUTPT VISIT, EST, LEVL III, 20-29 MIN: ICD-10-PCS | Mod: S$PBB,,, | Performed by: OBSTETRICS & GYNECOLOGY

## 2021-12-08 PROCEDURE — 99999 PR PBB SHADOW E&M-EST. PATIENT-LVL II: ICD-10-PCS | Mod: PBBFAC,,, | Performed by: OBSTETRICS & GYNECOLOGY

## 2021-12-08 PROCEDURE — 99213 OFFICE O/P EST LOW 20 MIN: CPT | Mod: S$PBB,,, | Performed by: OBSTETRICS & GYNECOLOGY

## 2021-12-08 PROCEDURE — 99212 OFFICE O/P EST SF 10 MIN: CPT | Mod: PBBFAC | Performed by: OBSTETRICS & GYNECOLOGY

## 2021-12-09 ENCOUNTER — ROUTINE PRENATAL (OUTPATIENT)
Dept: OBSTETRICS AND GYNECOLOGY | Facility: CLINIC | Age: 41
End: 2021-12-09
Payer: OTHER GOVERNMENT

## 2021-12-09 VITALS
WEIGHT: 163.13 LBS | BODY MASS INDEX: 27.99 KG/M2 | SYSTOLIC BLOOD PRESSURE: 126 MMHG | DIASTOLIC BLOOD PRESSURE: 72 MMHG

## 2021-12-09 DIAGNOSIS — Q91.7: ICD-10-CM

## 2021-12-09 DIAGNOSIS — O09.521 MULTIGRAVIDA OF ADVANCED MATERNAL AGE IN FIRST TRIMESTER: ICD-10-CM

## 2021-12-09 DIAGNOSIS — Z3A.36 36 WEEKS GESTATION OF PREGNANCY: Primary | ICD-10-CM

## 2021-12-09 LAB
BILIRUB SERPL-MCNC: NORMAL MG/DL
BLOOD URINE, POC: NORMAL
CLARITY, POC UA: CLEAR
COLOR, POC UA: YELLOW
GLUCOSE UR QL STRIP: NORMAL
KETONES UR QL STRIP: NORMAL
LEUKOCYTE ESTERASE URINE, POC: POSITIVE
NITRITE, POC UA: NORMAL
PH, POC UA: 6
PROTEIN, POC: NORMAL
SPECIFIC GRAVITY, POC UA: NORMAL
UROBILINOGEN, POC UA: NORMAL

## 2021-12-09 PROCEDURE — 87081 CULTURE SCREEN ONLY: CPT | Performed by: SPECIALIST

## 2021-12-09 PROCEDURE — 0502F PR SUBSEQUENT PRENATAL CARE: ICD-10-PCS | Mod: ,,, | Performed by: SPECIALIST

## 2021-12-09 PROCEDURE — 99999 PR PBB SHADOW E&M-EST. PATIENT-LVL II: ICD-10-PCS | Mod: PBBFAC,,, | Performed by: SPECIALIST

## 2021-12-09 PROCEDURE — 0502F SUBSEQUENT PRENATAL CARE: CPT | Mod: ,,, | Performed by: SPECIALIST

## 2021-12-09 PROCEDURE — 99999 PR PBB SHADOW E&M-EST. PATIENT-LVL II: CPT | Mod: PBBFAC,,, | Performed by: SPECIALIST

## 2021-12-09 PROCEDURE — 99212 OFFICE O/P EST SF 10 MIN: CPT | Mod: PBBFAC,PN | Performed by: SPECIALIST

## 2021-12-09 PROCEDURE — 81002 URINALYSIS NONAUTO W/O SCOPE: CPT | Mod: PBBFAC,PN | Performed by: SPECIALIST

## 2021-12-13 ENCOUNTER — PATIENT MESSAGE (OUTPATIENT)
Dept: OBSTETRICS AND GYNECOLOGY | Facility: CLINIC | Age: 41
End: 2021-12-13
Payer: OTHER GOVERNMENT

## 2021-12-13 LAB — BACTERIA SPEC AEROBE CULT: NORMAL

## 2021-12-14 ENCOUNTER — PATIENT MESSAGE (OUTPATIENT)
Dept: MATERNAL FETAL MEDICINE | Facility: CLINIC | Age: 41
End: 2021-12-14
Payer: OTHER GOVERNMENT

## 2021-12-14 ENCOUNTER — ROUTINE PRENATAL (OUTPATIENT)
Dept: OBSTETRICS AND GYNECOLOGY | Facility: CLINIC | Age: 41
End: 2021-12-14
Payer: OTHER GOVERNMENT

## 2021-12-14 VITALS
WEIGHT: 159.81 LBS | SYSTOLIC BLOOD PRESSURE: 124 MMHG | BODY MASS INDEX: 27.42 KG/M2 | DIASTOLIC BLOOD PRESSURE: 82 MMHG

## 2021-12-14 DIAGNOSIS — Z3A.36 36 WEEKS GESTATION OF PREGNANCY: Primary | ICD-10-CM

## 2021-12-14 LAB
BILIRUB SERPL-MCNC: NORMAL MG/DL
BLOOD URINE, POC: NORMAL
CLARITY, POC UA: CLEAR
COLOR, POC UA: YELLOW
GLUCOSE UR QL STRIP: NORMAL
KETONES UR QL STRIP: NORMAL
LEUKOCYTE ESTERASE URINE, POC: POSITIVE
NITRITE, POC UA: NORMAL
PH, POC UA: 5
PROTEIN, POC: NORMAL
SPECIFIC GRAVITY, POC UA: NORMAL
UROBILINOGEN, POC UA: NORMAL

## 2021-12-14 PROCEDURE — 0502F PR SUBSEQUENT PRENATAL CARE: ICD-10-PCS | Mod: ,,, | Performed by: SPECIALIST

## 2021-12-14 PROCEDURE — 0502F SUBSEQUENT PRENATAL CARE: CPT | Mod: ,,, | Performed by: SPECIALIST

## 2021-12-14 PROCEDURE — 99212 OFFICE O/P EST SF 10 MIN: CPT | Mod: PBBFAC,PN | Performed by: SPECIALIST

## 2021-12-14 PROCEDURE — 81002 URINALYSIS NONAUTO W/O SCOPE: CPT | Mod: PBBFAC,PN | Performed by: SPECIALIST

## 2021-12-14 PROCEDURE — 99999 PR PBB SHADOW E&M-EST. PATIENT-LVL II: CPT | Mod: PBBFAC,,, | Performed by: SPECIALIST

## 2021-12-14 PROCEDURE — 99999 PR PBB SHADOW E&M-EST. PATIENT-LVL II: ICD-10-PCS | Mod: PBBFAC,,, | Performed by: SPECIALIST

## 2021-12-18 ENCOUNTER — PATIENT MESSAGE (OUTPATIENT)
Dept: OBSTETRICS AND GYNECOLOGY | Facility: CLINIC | Age: 41
End: 2021-12-18
Payer: OTHER GOVERNMENT

## 2021-12-18 ENCOUNTER — PATIENT MESSAGE (OUTPATIENT)
Dept: MATERNAL FETAL MEDICINE | Facility: CLINIC | Age: 41
End: 2021-12-18
Payer: OTHER GOVERNMENT

## 2021-12-20 ENCOUNTER — ANESTHESIA EVENT (OUTPATIENT)
Dept: OBSTETRICS AND GYNECOLOGY | Facility: OTHER | Age: 41
End: 2021-12-20

## 2021-12-20 ENCOUNTER — HOSPITAL ENCOUNTER (INPATIENT)
Facility: OTHER | Age: 41
LOS: 3 days | Discharge: HOME | End: 2021-12-23
Attending: OBSTETRICS & GYNECOLOGY | Admitting: OBSTETRICS & GYNECOLOGY
Payer: OTHER GOVERNMENT

## 2021-12-20 ENCOUNTER — ANESTHESIA (OUTPATIENT)
Dept: OBSTETRICS AND GYNECOLOGY | Facility: OTHER | Age: 41
End: 2021-12-20

## 2021-12-20 DIAGNOSIS — O42.90 PROM (PREMATURE RUPTURE OF MEMBRANES): ICD-10-CM

## 2021-12-20 DIAGNOSIS — O35.9XX0 PREGNANCY COMPLICATED BY MULTIPLE FETAL CONGENITAL ANOMALIES: ICD-10-CM

## 2021-12-20 PROBLEM — O09.523 AMA (ADVANCED MATERNAL AGE) MULTIGRAVIDA 35+, THIRD TRIMESTER: Status: ACTIVE | Noted: 2021-07-08

## 2021-12-20 LAB
ABO + RH BLD: NORMAL
ALBUMIN SERPL BCP-MCNC: 3.1 G/DL (ref 3.5–5.2)
ALP SERPL-CCNC: 213 U/L (ref 55–135)
ALT SERPL W/O P-5'-P-CCNC: 16 U/L (ref 10–44)
ANION GAP SERPL CALC-SCNC: 13 MMOL/L (ref 8–16)
AST SERPL-CCNC: 19 U/L (ref 10–40)
BASOPHILS # BLD AUTO: 0.04 K/UL (ref 0–0.2)
BASOPHILS NFR BLD: 0.4 % (ref 0–1.9)
BILIRUB SERPL-MCNC: 0.4 MG/DL (ref 0.1–1)
BLD GP AB SCN CELLS X3 SERPL QL: NORMAL
BUN SERPL-MCNC: 12 MG/DL (ref 6–20)
CALCIUM SERPL-MCNC: 9 MG/DL (ref 8.7–10.5)
CHLORIDE SERPL-SCNC: 105 MMOL/L (ref 95–110)
CO2 SERPL-SCNC: 17 MMOL/L (ref 23–29)
CREAT SERPL-MCNC: 0.8 MG/DL (ref 0.5–1.4)
CREAT UR-MCNC: 41.3 MG/DL (ref 15–325)
DIFFERENTIAL METHOD: ABNORMAL
EOSINOPHIL # BLD AUTO: 0.2 K/UL (ref 0–0.5)
EOSINOPHIL NFR BLD: 2.1 % (ref 0–8)
ERYTHROCYTE [DISTWIDTH] IN BLOOD BY AUTOMATED COUNT: 13.3 % (ref 11.5–14.5)
EST. GFR  (AFRICAN AMERICAN): >60 ML/MIN/1.73 M^2
EST. GFR  (NON AFRICAN AMERICAN): >60 ML/MIN/1.73 M^2
GLUCOSE SERPL-MCNC: 73 MG/DL (ref 70–110)
HCT VFR BLD AUTO: 35.1 % (ref 37–48.5)
HGB BLD-MCNC: 11.6 G/DL (ref 12–16)
HIV 1+2 AB+HIV1 P24 AG SERPL QL IA: NEGATIVE
IMM GRANULOCYTES # BLD AUTO: 0.03 K/UL (ref 0–0.04)
IMM GRANULOCYTES NFR BLD AUTO: 0.3 % (ref 0–0.5)
LYMPHOCYTES # BLD AUTO: 1.8 K/UL (ref 1–4.8)
LYMPHOCYTES NFR BLD: 20 % (ref 18–48)
MCH RBC QN AUTO: 30.8 PG (ref 27–31)
MCHC RBC AUTO-ENTMCNC: 33 G/DL (ref 32–36)
MCV RBC AUTO: 93 FL (ref 82–98)
MONOCYTES # BLD AUTO: 0.9 K/UL (ref 0.3–1)
MONOCYTES NFR BLD: 9.5 % (ref 4–15)
NEUTROPHILS # BLD AUTO: 6 K/UL (ref 1.8–7.7)
NEUTROPHILS NFR BLD: 67.7 % (ref 38–73)
NRBC BLD-RTO: 0 /100 WBC
PLATELET # BLD AUTO: 303 K/UL (ref 150–450)
PMV BLD AUTO: 10.4 FL (ref 9.2–12.9)
POTASSIUM SERPL-SCNC: 4.2 MMOL/L (ref 3.5–5.1)
PROT SERPL-MCNC: 7.1 G/DL (ref 6–8.4)
PROT UR-MCNC: <7 MG/DL (ref 0–15)
PROT/CREAT UR: NORMAL MG/G{CREAT} (ref 0–0.2)
RBC # BLD AUTO: 3.77 M/UL (ref 4–5.4)
SARS-COV-2 RDRP RESP QL NAA+PROBE: NEGATIVE
SODIUM SERPL-SCNC: 135 MMOL/L (ref 136–145)
WBC # BLD AUTO: 8.93 K/UL (ref 3.9–12.7)

## 2021-12-20 PROCEDURE — 84156 ASSAY OF PROTEIN URINE: CPT | Performed by: OBSTETRICS & GYNECOLOGY

## 2021-12-20 PROCEDURE — 80053 COMPREHEN METABOLIC PANEL: CPT | Performed by: OBSTETRICS & GYNECOLOGY

## 2021-12-20 PROCEDURE — 72100002 HC LABOR CARE, 1ST 8 HOURS

## 2021-12-20 PROCEDURE — 25000003 PHARM REV CODE 250: Performed by: STUDENT IN AN ORGANIZED HEALTH CARE EDUCATION/TRAINING PROGRAM

## 2021-12-20 PROCEDURE — 99285 EMERGENCY DEPT VISIT HI MDM: CPT | Mod: 25

## 2021-12-20 PROCEDURE — 99283 PR EMERGENCY DEPT VISIT,LEVEL III: ICD-10-PCS | Mod: ,,, | Performed by: OBSTETRICS & GYNECOLOGY

## 2021-12-20 PROCEDURE — 11000001 HC ACUTE MED/SURG PRIVATE ROOM

## 2021-12-20 PROCEDURE — 86592 SYPHILIS TEST NON-TREP QUAL: CPT | Performed by: OBSTETRICS & GYNECOLOGY

## 2021-12-20 PROCEDURE — 87389 HIV-1 AG W/HIV-1&-2 AB AG IA: CPT | Performed by: OBSTETRICS & GYNECOLOGY

## 2021-12-20 PROCEDURE — 63600175 PHARM REV CODE 636 W HCPCS: Performed by: STUDENT IN AN ORGANIZED HEALTH CARE EDUCATION/TRAINING PROGRAM

## 2021-12-20 PROCEDURE — 85025 COMPLETE CBC W/AUTO DIFF WBC: CPT | Performed by: OBSTETRICS & GYNECOLOGY

## 2021-12-20 PROCEDURE — U0002 COVID-19 LAB TEST NON-CDC: HCPCS | Performed by: OBSTETRICS & GYNECOLOGY

## 2021-12-20 PROCEDURE — 86850 RBC ANTIBODY SCREEN: CPT | Performed by: OBSTETRICS & GYNECOLOGY

## 2021-12-20 PROCEDURE — 99283 EMERGENCY DEPT VISIT LOW MDM: CPT | Mod: ,,, | Performed by: OBSTETRICS & GYNECOLOGY

## 2021-12-20 RX ORDER — MAGNESIUM SULFATE HEPTAHYDRATE 40 MG/ML
4 INJECTION, SOLUTION INTRAVENOUS ONCE
Status: COMPLETED | OUTPATIENT
Start: 2021-12-20 | End: 2021-12-20

## 2021-12-20 RX ORDER — LABETALOL HYDROCHLORIDE 5 MG/ML
20 INJECTION, SOLUTION INTRAVENOUS ONCE
Status: COMPLETED | OUTPATIENT
Start: 2021-12-20 | End: 2021-12-20

## 2021-12-20 RX ORDER — CEFAZOLIN SODIUM 2 G/50ML
2 SOLUTION INTRAVENOUS ONCE AS NEEDED
Status: DISCONTINUED | OUTPATIENT
Start: 2021-12-20 | End: 2021-12-22

## 2021-12-20 RX ORDER — SODIUM CHLORIDE, SODIUM LACTATE, POTASSIUM CHLORIDE, CALCIUM CHLORIDE 600; 310; 30; 20 MG/100ML; MG/100ML; MG/100ML; MG/100ML
INJECTION, SOLUTION INTRAVENOUS CONTINUOUS
Status: DISCONTINUED | OUTPATIENT
Start: 2021-12-20 | End: 2021-12-22

## 2021-12-20 RX ORDER — TRANEXAMIC ACID 100 MG/ML
1000 INJECTION, SOLUTION INTRAVENOUS ONCE AS NEEDED
Status: DISCONTINUED | OUTPATIENT
Start: 2021-12-20 | End: 2021-12-22

## 2021-12-20 RX ORDER — PROCHLORPERAZINE EDISYLATE 5 MG/ML
5 INJECTION INTRAMUSCULAR; INTRAVENOUS EVERY 6 HOURS PRN
Status: DISCONTINUED | OUTPATIENT
Start: 2021-12-20 | End: 2021-12-22 | Stop reason: SDUPTHER

## 2021-12-20 RX ORDER — SODIUM CHLORIDE 9 MG/ML
INJECTION, SOLUTION INTRAVENOUS
Status: DISCONTINUED | OUTPATIENT
Start: 2021-12-20 | End: 2021-12-22

## 2021-12-20 RX ORDER — CALCIUM CARBONATE 200(500)MG
500 TABLET,CHEWABLE ORAL 3 TIMES DAILY PRN
Status: DISCONTINUED | OUTPATIENT
Start: 2021-12-20 | End: 2021-12-22

## 2021-12-20 RX ORDER — OXYTOCIN/RINGER'S LACTATE 30/500 ML
0-30 PLASTIC BAG, INJECTION (ML) INTRAVENOUS CONTINUOUS
Status: DISCONTINUED | OUTPATIENT
Start: 2021-12-20 | End: 2021-12-22

## 2021-12-20 RX ORDER — MAGNESIUM SULFATE HEPTAHYDRATE 40 MG/ML
2 INJECTION, SOLUTION INTRAVENOUS CONTINUOUS
Status: DISCONTINUED | OUTPATIENT
Start: 2021-12-20 | End: 2021-12-22

## 2021-12-20 RX ORDER — OXYTOCIN/RINGER'S LACTATE 30/500 ML
95 PLASTIC BAG, INJECTION (ML) INTRAVENOUS ONCE
Status: DISCONTINUED | OUTPATIENT
Start: 2021-12-20 | End: 2021-12-22

## 2021-12-20 RX ORDER — SIMETHICONE 80 MG
1 TABLET,CHEWABLE ORAL 4 TIMES DAILY PRN
Status: DISCONTINUED | OUTPATIENT
Start: 2021-12-20 | End: 2021-12-22 | Stop reason: SDUPTHER

## 2021-12-20 RX ORDER — ONDANSETRON 8 MG/1
8 TABLET, ORALLY DISINTEGRATING ORAL EVERY 8 HOURS PRN
Status: DISCONTINUED | OUTPATIENT
Start: 2021-12-20 | End: 2021-12-22 | Stop reason: SDUPTHER

## 2021-12-20 RX ORDER — OXYTOCIN/RINGER'S LACTATE 30/500 ML
334 PLASTIC BAG, INJECTION (ML) INTRAVENOUS ONCE
Status: DISCONTINUED | OUTPATIENT
Start: 2021-12-20 | End: 2021-12-22

## 2021-12-20 RX ORDER — CALCIUM GLUCONATE 98 MG/ML
1 INJECTION, SOLUTION INTRAVENOUS
Status: DISCONTINUED | OUTPATIENT
Start: 2021-12-20 | End: 2021-12-23 | Stop reason: HOSPADM

## 2021-12-20 RX ADMIN — SODIUM CHLORIDE, SODIUM LACTATE, POTASSIUM CHLORIDE, AND CALCIUM CHLORIDE 1000 ML: .6; .31; .03; .02 INJECTION, SOLUTION INTRAVENOUS at 08:12

## 2021-12-20 RX ADMIN — MAGNESIUM SULFATE HEPTAHYDRATE 4 G: 40 INJECTION, SOLUTION INTRAVENOUS at 08:12

## 2021-12-20 RX ADMIN — LABETALOL HYDROCHLORIDE 20 MG: 5 INJECTION, SOLUTION INTRAVENOUS at 08:12

## 2021-12-20 RX ADMIN — MAGNESIUM SULFATE IN WATER 2 G/HR: 40 INJECTION, SOLUTION INTRAVENOUS at 08:12

## 2021-12-21 PROCEDURE — 11000001 HC ACUTE MED/SURG PRIVATE ROOM

## 2021-12-21 PROCEDURE — 88307 PR  SURG PATH,LEVEL V: ICD-10-PCS | Mod: 26,,, | Performed by: PATHOLOGY

## 2021-12-21 PROCEDURE — 59400 PR FULL ROUT OBSTE CARE,VAGINAL DELIV: ICD-10-PCS | Mod: ,,, | Performed by: OBSTETRICS & GYNECOLOGY

## 2021-12-21 PROCEDURE — 88307 TISSUE EXAM BY PATHOLOGIST: CPT | Performed by: PATHOLOGY

## 2021-12-21 PROCEDURE — 59400 OBSTETRICAL CARE: CPT | Mod: ,,, | Performed by: OBSTETRICS & GYNECOLOGY

## 2021-12-21 PROCEDURE — 25000003 PHARM REV CODE 250

## 2021-12-21 PROCEDURE — 88307 TISSUE EXAM BY PATHOLOGIST: CPT | Mod: 26,,, | Performed by: PATHOLOGY

## 2021-12-21 PROCEDURE — 72200005 HC VAGINAL DELIVERY LEVEL II

## 2021-12-21 PROCEDURE — 63600175 PHARM REV CODE 636 W HCPCS: Performed by: STUDENT IN AN ORGANIZED HEALTH CARE EDUCATION/TRAINING PROGRAM

## 2021-12-21 PROCEDURE — 25000003 PHARM REV CODE 250: Performed by: STUDENT IN AN ORGANIZED HEALTH CARE EDUCATION/TRAINING PROGRAM

## 2021-12-21 PROCEDURE — 72100003 HC LABOR CARE, EA. ADDL. 8 HRS

## 2021-12-21 RX ORDER — IBUPROFEN 600 MG/1
600 TABLET ORAL EVERY 6 HOURS PRN
Status: DISCONTINUED | OUTPATIENT
Start: 2021-12-21 | End: 2021-12-23 | Stop reason: HOSPADM

## 2021-12-21 RX ORDER — OXYTOCIN 10 [USP'U]/ML
INJECTION, SOLUTION INTRAMUSCULAR; INTRAVENOUS
Status: DISPENSED
Start: 2021-12-21 | End: 2021-12-22

## 2021-12-21 RX ORDER — ACETAMINOPHEN 500 MG
1000 TABLET ORAL ONCE
Status: COMPLETED | OUTPATIENT
Start: 2021-12-21 | End: 2021-12-21

## 2021-12-21 RX ORDER — OXYTOCIN/RINGER'S LACTATE 30/500 ML
95 PLASTIC BAG, INJECTION (ML) INTRAVENOUS ONCE
Status: CANCELLED | OUTPATIENT
Start: 2021-12-21 | End: 2021-12-21

## 2021-12-21 RX ADMIN — SODIUM CHLORIDE, SODIUM LACTATE, POTASSIUM CHLORIDE, AND CALCIUM CHLORIDE 1000 ML: .6; .31; .03; .02 INJECTION, SOLUTION INTRAVENOUS at 09:12

## 2021-12-21 RX ADMIN — IBUPROFEN 600 MG: 600 TABLET ORAL at 05:12

## 2021-12-21 RX ADMIN — ACETAMINOPHEN 1000 MG: 500 TABLET, FILM COATED ORAL at 06:12

## 2021-12-21 RX ADMIN — SODIUM CHLORIDE, SODIUM LACTATE, POTASSIUM CHLORIDE, AND CALCIUM CHLORIDE 1000 ML: .6; .31; .03; .02 INJECTION, SOLUTION INTRAVENOUS at 11:12

## 2021-12-21 RX ADMIN — Medication 1 MILLI-UNITS/MIN: at 11:12

## 2021-12-21 RX ADMIN — MAGNESIUM SULFATE IN WATER 2 G/HR: 40 INJECTION, SOLUTION INTRAVENOUS at 03:12

## 2021-12-21 NOTE — ED PROVIDER NOTES
Encounter Date: 2021       History     Chief Complaint   Patient presents with    Rupture of Membranes     From saturday     Gretta Carson is a 41 y.o. [id number] at 37w4d presents complaining of leakage of fluid since Saturday. She states that she was sitting in a recliner on Saturday when she felt a trickle of fluid that required her to change her underwear. Since then she has continued to experience leaking of clear fluid. Endorses intermittent abdominal cramping. Unsure if she has been having contractions.  This IUP is complicated by trisomy 13 w/ multiple fetal anomalies (CHD, bilateral cleft lip/palate, polydactyly, HEB), AMA.      Patient denies contractions, denies vaginal bleeding, reports LOF.   Fetal Movement: normal.     HPI  Review of patient's allergies indicates:  No Known Allergies  Past Medical History:   Diagnosis Date    Abnormal Pap smear of cervix     PTSD (post-traumatic stress disorder)      Past Surgical History:   Procedure Laterality Date    CERVICAL BIOPSY  W/ LOOP ELECTRODE EXCISION      COLPOSCOPY      HERNIA REPAIR       Family History   Problem Relation Age of Onset    Breast cancer Neg Hx     Miscarriages / Stillbirths Neg Hx      Social History     Tobacco Use    Smoking status: Never Smoker    Smokeless tobacco: Never Used   Substance Use Topics    Alcohol use: No    Drug use: No     Review of Systems   Constitutional: Negative for chills, fatigue and fever.   HENT: Negative for congestion, rhinorrhea and sore throat.    Eyes: Negative for visual disturbance.   Respiratory: Negative for cough, chest tightness and shortness of breath.    Cardiovascular: Negative for chest pain and leg swelling.   Gastrointestinal: Negative for abdominal pain, nausea and vomiting.   Genitourinary: Positive for vaginal discharge (leakage of clear fluid). Negative for dysuria, pelvic pain and vaginal bleeding.   Musculoskeletal: Negative for back pain.   Neurological: Negative for  dizziness and headaches.       Physical Exam     Initial Vitals   BP Pulse Resp Temp SpO2   12/20/21 1909 12/20/21 1908 12/20/21 1937 12/20/21 1937 12/20/21 1908   (!) 146/94 73 18 98 °F (36.7 °C) 100 %      MAP       --                Physical Exam    Constitutional: She appears well-developed and well-nourished. No distress.   HENT:   Head: Normocephalic and atraumatic.   Eyes: Conjunctivae and EOM are normal.   Neck: No thyromegaly present.   Normal range of motion.  Cardiovascular: Normal rate.   Pulmonary/Chest:   Normal work of breathing.   Abdominal: Abdomen is soft. There is no abdominal tenderness. There is no rebound and no guarding.   Gravid uterus.   Musculoskeletal:         General: No edema. Normal range of motion.      Cervical back: Normal range of motion.     Neurological: She is alert and oriented to person, place, and time.   Skin: Skin is warm and dry.   Psychiatric: She has a normal mood and affect. Thought content normal.     OB LABOR EXAM:   Pre-Term Labor: No.   Membranes ruptured: Yes.   Method: Sterile speculum exam per MD and Sterile vaginal exam per MD.   Vaginal Bleeding: none present.   Engagement: engaged.   Dilatation: 6.   Station: -1.   Effacement: 80%.   Amniotic Fluid Color: clear.   Amniotic Fluid Amount: moderate.         ED Course   Obtain Fetal nonstress test (NST)    Date/Time: 12/20/2021 7:22 PM  Performed by: Rafa Braxton MD  Authorized by: Siomara Ríos MD         Labs Reviewed   CBC W/ AUTO DIFFERENTIAL - Abnormal; Notable for the following components:       Result Value    RBC 3.77 (*)     Hemoglobin 11.6 (*)     Hematocrit 35.1 (*)     All other components within normal limits    Narrative:     Release to patient->Immediate   SARS-COV-2 RNA AMPLIFICATION, QUAL   RPR   HIV 1 / 2 ANTIBODY          Imaging Results    None          Medications   lactated ringers bolus 1,000 mL (has no administration in time range)   lactated ringers bolus 500 mL (has no administration  in time range)   lactated ringers infusion ( Intravenous Canceled Entry 12/20/21 2015)   0.9%  NaCl infusion (has no administration in time range)   calcium carbonate 200 mg calcium (500 mg) chewable tablet 500 mg (has no administration in time range)   simethicone chewable tablet 80 mg (has no administration in time range)   ondansetron disintegrating tablet 8 mg (has no administration in time range)   prochlorperazine injection Soln 5 mg (has no administration in time range)   oxytocin 30 units in 500 mL lactated ringers infusion (non-titrating) (has no administration in time range)   oxytocin 30 units in 500 mL lactated ringers infusion (non-titrating) (has no administration in time range)   tranexamic acid (CYKLOKAPRON) 1,000 mg in sodium chloride 0.9 % 100 mL (ready to mix system) (has no administration in time range)   azithromycin 500 mg in dextrose 5 % 250 mL IVPB (ready to mix system) (has no administration in time range)   cefazolin (ANCEF) 2 gram in dextrose 5% 50 mL IVPB (premix) (has no administration in time range)   oxytocin 30 units in 500 mL lactated ringers infusion (titrating) (has no administration in time range)   magnesium sulfate in water 40 gram/1,000 mL (4 %) bolus from bag 4 g (4 g Intravenous Bolus from Bag 12/20/21 2030)   magnesium sulfate in water 40 gram/1,000 mL (4 %) infusion (has no administration in time range)   calcium gluconate 100 mg/mL (10%) injection 1 g (has no administration in time range)   lactated ringers infusion (1,000 mLs Intravenous New Bag 12/20/21 2036)   labetaloL injection 20 mg (20 mg Intravenous Given 12/20/21 2027)     Medical Decision Making:   ED Management:  Mild range blood pressures noted  CBC, CMP, and 3T labs ordered  Pooling of clear fluid noted on speculum exam  SVE: 6/80/-1  Fetal heart tones confirmed, 140s  Patient with known trisomy 13 fetus, desires comfort care  Will admit for PROM with labor augmentation            Attending Attestation:    Physician Attestation Statement for Resident:  As the supervising MD   Physician Attestation Statement: I have personally seen and examined this patient.   I agree with the above history. -:   As the supervising MD I agree with the above PE.    As the supervising MD I agree with the above treatment, course, plan, and disposition.  I was personally present during the critical portions of the procedure(s) performed by the resident and was immediately available in the ED to provide services and assistance as needed during the entire procedure.                         Clinical Impression:   Final diagnoses:  [O42.90] PROM (premature rupture of membranes)          ED Disposition Condition    Send to L&D               Shahnaz Philippe MD  12/21/21 0011

## 2021-12-21 NOTE — L&D DELIVERY NOTE
[facility] - Labor & Delivery  Vaginal Delivery   Operative Note    SUMMARY   MD to bedside for delivery. Patient complete and +1 station. Maternal pushing efforts were started. NICU palliative care team notified. Approximately 40 minutes of pushing resulted in   Normal spontaneous vaginal delivery of live infant, skin to skin was unable to be performed due to assessment by NICU team. Infant had obvious facial malformations of cleft palate. This was a known trisomy 13 pregnancy.     Infant delivered position OA over intact perineum.  Nuchal cord: Yes, cord reduced at perineum.    Manual delivery of placenta and IV pitocin given noting good uterine tone.  No lacerations noted.  Patient tolerated delivery well. Sponge needle and lap counted correctly x2.    Indications: Pregnancy complicated by multiple fetal congenital anomalies  Pregnancy complicated by:   Patient Active Problem List   Diagnosis    Hip pain    AMANDA III (cervical intraepithelial neoplasia grade III) with severe dysplasia    AMA (advanced maternal age) multigravida 35+, third trimester    Abnormal genetic test during pregnancy    Pregnancy complicated by multiple fetal congenital anomalies    Confirmed trisomy syndrome, fetal, affecting care of mother, antepart    PROM (premature rupture of membranes)     Admitting GA: 37w5d    Delivery Information for Lynn Carson    Birth information:  YOB: 2019   Time of birth: 4:57 PM   Sex: female   Head Delivery Date/Time: 12/21/2021  4:57 PM   Delivery type: Vaginal, Spontaneous   Gestational Age: 37w5d    Delivery Providers    Delivering clinician: Shannon York MD   Provider Role    Ronda Denney RN Registered Nurse    Minoo Bowen RN Registered Nurse    Candie Tillman RN Charge Nurse    Rena Ferguson MD Resident            Measurements    Weight:   Length:          Apgars    Living status: Living  Apgars:  1 min.:  5 min.:  10 min.:  15 min.:  20 min.:    Skin color:   1  0       Heart rate:  2  2       Reflex irritability:  1  2       Muscle tone:  1  2       Respiratory effort:  2  2       Total:  7  8       Apgars assigned by: MIGUEL ANGEL HE         Operative Delivery    Forceps attempted?: No  Vacuum extractor attempted?: No         Shoulder Dystocia    Shoulder dystocia present?: No           Presentation    Presentation: Vertex  Position: Middle Occiput Anterior           Interventions/Resuscitation    Method: None       Cord    Vessels: 3 vessels  Complications: Nuchal  Nuchal Intervention: reduced  Nuchal Cord Description: loose nuchal cord  Number of Loops: 1  Delayed Cord Clamping?: No  Cord Clamped Date/Time: 2021  4:58 PM  Cord Blood Disposition: Discarded  Gases Sent?: No  Stem Cell Collection (by MD): No       Placenta    Placenta delivery date/time: 2021 1710  Placenta removal: Expressed  Placenta appearance: Abnormal  Placenta disposition: pathology           Labor Events:       labor: No     Labor Onset Date/Time:         Dilation Complete Date/Time:         Start Pushing Date/Time: 2021 16:22       Start Pushing Date/Time: 2021 16:22     Rupture Date/Time:            Rupture type:          Fluid Amount:       Fluid Color:       Fluid Odor:       Membrane Status:                steroids: None     Antibiotics given for GBS: No     Induction:       Indications for induction:  Prolonged ROM;Fetal Abnormality     Augmentation: oxytocin     Indications for augmentation: Ineffective Contraction Pattern;Preeclampsia;Fetal Heart Rate or Rhythm Abnormality     Labor complications: None     Additional complications:          Cervical ripening:                     Delivery:      Episiotomy: None     Indication for Episiotomy:       Perineal Lacerations: 1st Repaired:  No   Periurethral Laceration:   Repaired:     Labial Laceration:   Repaired:     Sulcus Laceration:   Repaired:     Vaginal Laceration:   Repaired:     Cervical Laceration:    Repaired:     Repair suture:       Repair # of packets: 0     Last Value - EBL - Nursing (mL):       Sum - EBL - Nursing (mL): 0     Last Value - EBL - Anesthesia (mL):      Calculated QBL (mL): 50      Vaginal Sweep Performed: Yes     Surgicount Correct:         Other providers:       Anesthesia    Method: None          Details (if applicable):  Trial of Labor      Categorization:      Priority:     Indications for :     Incision Type:       Additional  information:  Forceps:    Vacuum:    Breech:    Observed anomalies    Other (Comments):

## 2021-12-21 NOTE — CONSULTS
Consultation    This evening, Mrs. Carson was admitted to labor and delivery with advanced cerical dilatation at a gestation of 37 4/7 weeks. She is known to our  service as having a baby affected by Trisomy 13. In utero findings have included cleft lip and palate along with significant congenital cyanotic heart disease. Previously, Mrs Carson has met with Minoo Bowen RN who is our Nursing Director of the NICU Palliative Care Team.  Since that time, Mrs. Carson and her  have expressed the desire to offer comfort care to their daughter following delivery. I met with the [place] in LDR 13 late in the evening on  to further explore their wishes with the assumption that the baby is born alive following a vaginal birth. During labor, per the request of the parents, no fetal heart rate monitoring is planned and no surgical intervention will take place.  The infant's mother had prenatal care and was known to be a 39-year-old blood type A+ [id number] black female.Maternal testing for hepatitis B surface antigen, HIV, and syphilis were negative.The estimated date of delivery was 2022 making the gestation 37-4/7th weeks at the time of our meeting.Maternal medical history was complex as it included a previous pregnancy which resulted in a fetal demise at 26 weeks and this pregnancy which has a fetus with confirmed Trisomy 13.Medications taken during the pregnancy included aspirin and prenatal vitamins.Complications to labor included  and prolonged rupture of the amniotic membranes.  Mother, father and I discussed delivery room plans and nutritional support following delivery of a live infant. At delivery, the infant may be bulb suctioned and dried. She will then be wrapped in a blanket and placed in the arms of her mother or father. No supplemental oxygen, CPAP or endotracheal intubation will be performed.  Regarding nutritional support, the parents want no IV placement nor parenteral fluid  administration.They understand that due to the fact that their daughter has a cleft lip and palate as part of the Trisomy 13 manifestations (and therefore nipple feedings are not likely to be successful),they have two options regarding enteral nutrition.Feedings may be either administered by gavage or they may be withheld (dehydration therapy).Whichever choice they select will be the default method of support planned at the time of discharge (though they certainly know that they may change their decision at any time).They are aware that should their daughter survive beyond the first days of life, that Hospice Care will be arranged so that they may bring their daughter home.Should they wish to perform gavage feedings, then these feedings will begin after delivery and every effort will be made to keep the family together with feedings taking place with the baby in her mother's room. There are no plans to transfer this baby to the NICU as she will be managed by the neonatologists on the mother/baby unit. The parents would then be instructed and then demonstrate competency on orogastric tube placement and usage. Their decision is pending at this time.    Jai Tan MD  Staff neonatologist  Beeper [phone]  Cell [phone]

## 2021-12-21 NOTE — NURSING
NICU Palliative Care Note    Spoke with mom, dad, and family @ bedside.  Mom upbeat, looking forward to birth of Male'bárbara Believe Richard.  Discussed plan of care; I will attend delivery, Male'ah will be dried, bulb syringe will be used to clear airway and handed to mom to hold per Dr. Tan's notes and discussion with family early this am.  Parents still in agreement with plan. Contact number left with OB bedside RN.

## 2021-12-21 NOTE — PROGRESS NOTES
LABOR NOTE    S:  Complaints: No.  Epidural working:  Not Applicable.   Resident to bedside for routine cervical exam. Of note, patient has had 2 sustained severely elevated blood pressures.     O: BP (!) 152/87   Pulse 75   SpO2 100%       FHT: not being monitored due to fetal trisomy 13   CTX: difficult to discern on TOCO   SVE:       ASSESSMENT:   41 y.o.  at 37w4d, PROM.     FHT reassuring    Active Hospital Problems    Diagnosis  POA    *Pregnancy complicated by multiple fetal congenital anomalies [O35.8XX0]  Yes     CHD, bilat cl/cp, polydactyly, HEB      PROM (premature rupture of membranes) [O42.90]  Unknown    Confirmed trisomy syndrome, fetal, affecting care of mother, antepart [O35.1XX0]  Yes     Trisomy 13      AMA (advanced maternal age) multigravida 35+, third trimester [O09.523]  Yes      Resolved Hospital Problems   No resolved problems to display.     TIMELINE:   0:   2000:      PLAN:    1. PROM:   - Recheck in 1 hr   - Will start pitocin per augmentation protocol if unchanged at next check   - No fetal monitoring 2/2 comfort care only     2. Pre-Eclampsia with Severe Features (BP):   - BP: (146-184)/(82-94) 184/87  - Asymptomatic   - CBC:    - CMP: Cr 0.8, AST/ALT   - Start magnesium for seizure prophylaxis   - Labetalol 20 mg; will retake blood pressure following 20 minutes     Shi Cee MD/MPH  OB/GYN PGY-1   [facility]

## 2021-12-21 NOTE — PROGRESS NOTES
LABOR NOTE    S:  Complaints: No.  Epidural working:  Not Applicable.   Resident to bedside for routine cervical exam.    O: /81   Pulse 77   Temp 98 °F (36.7 °C) (Temporal)   Resp 18   SpO2 100%       FHT: not being monitored due to fetal trisomy 13   CTX: difficult to discern on TOCO   SVE:       ASSESSMENT:   41 y.o.  at 37w4d, PROM.     FHT reassuring    Active Hospital Problems    Diagnosis  POA    *Pregnancy complicated by multiple fetal congenital anomalies [O35.8XX0]  Yes     CHD, bilat cl/cp, polydactyly, HEB      PROM (premature rupture of membranes) [O42.90]  Unknown    Confirmed trisomy syndrome, fetal, affecting care of mother, antepart [O35.1XX0]  Yes     Trisomy 13      AMA (advanced maternal age) multigravida 35+, third trimester [O09.523]  Yes      Resolved Hospital Problems   No resolved problems to display.     TIMELINE:   1900:   2000:    2345:     PLAN:    1. PROM:   - Recheck in 2 hrs   - Will continue to monitor and start pitocin per augmentation when appropriate   - No fetal monitoring 2/2 comfort care only     2. Pre-Eclampsia with Severe Features (BP):   - BP: (115-184)/(72-94) 115/81  - Asymptomatic   - CBC: /303   - CMP: Cr 0.8, AST/ALT 19/16  - P:C Ratio: TLTC   - Magnesium for seizure prophylaxis     Shi Cee MD/MPH  OB/GYN PGY-1   [facility]

## 2021-12-21 NOTE — H&P
HISTORY AND PHYSICAL                                                OBSTETRICS          Subjective:       Gretta Carson is a 41 y.o.  female with IUP at 37w4d weeks gestation who presents with rupture of membranes. She states that she was sitting in a recliner on Saturday when she felt a trickle of fluid that required her to change her underwear. Since then she has continued to experience leaking of clear fluid. Endorses intermittent abdominal cramping. Unsure if she has been having contractions. In the [facility] the patient was examined and rupture of membranes was confirmed.    Patient denies contractions, denies vaginal bleeding, reports LOF.   Fetal Movement: normal.    This IUP is complicated by trisomy 13 w/ multiple fetal anomalies (CHD, bilateral cleft lip/palate, polydactyly, HEB), AMA, anemia.    Review of Systems   Constitutional: Negative for fever.   Respiratory: Negative for shortness of breath.    Cardiovascular: Negative for chest pain.   Gastrointestinal: Negative for abdominal pain, nausea and vomiting.   Endocrine: Negative for hot flashes.   Genitourinary: Positive for vaginal discharge. Negative for menstrual problem.   Integumentary:  Negative for breast mass, nipple discharge and breast skin changes.   Neurological: Negative for headaches.   Hematological: Does not bruise/bleed easily.   Psychiatric/Behavioral: Negative for depression.   Breast: Negative for mass, mastodynia, nipple discharge and skin changes      PMHx:   Past Medical History:   Diagnosis Date    Abnormal Pap smear of cervix     PTSD (post-traumatic stress disorder)        PSHx:   Past Surgical History:   Procedure Laterality Date    CERVICAL BIOPSY  W/ LOOP ELECTRODE EXCISION      COLPOSCOPY      HERNIA REPAIR         All: Review of patient's allergies indicates:  No Known Allergies    Meds:   Medications Prior to Admission   Medication Sig Dispense Refill Last Dose    aspirin 81 MG Chew Take 1 tablet (81 mg total)  by mouth once daily. 100 tablet 3     PNV72-iron carb,glu-FA-dss-dha (CITRANATAL 90 DHA, ALGAL OIL,) 90 mg iron-1 mg -50 mg-300 mg Cmpk Take 1 tablet by mouth once daily. 100 each 3        SH:   Social History     Socioeconomic History    Marital status:    Tobacco Use    Smoking status: Never Smoker    Smokeless tobacco: Never Used   Substance and Sexual Activity    Alcohol use: No    Drug use: No    Sexual activity: Not Currently     Partners: Male     Birth control/protection: None       FH:   Family History   Problem Relation Age of Onset    Breast cancer Neg Hx     Miscarriages / Stillbirths Neg Hx        OBHx:   OB History    Para Term  AB Living   2 1 0 1 0 0   SAB IAB Ectopic Multiple Live Births   0 0 0 0 0      # Outcome Date GA Lbr Eliseo/2nd Weight Sex Delivery Anes PTL Lv   2 Current            1   26w0d    Vag-Spont   FD       Objective:       BP (!) 152/87   Pulse 75   SpO2 100%     Vitals:    21 1923 21 1925 21 1928 21 1930   BP:  (!) 159/91  (!) 152/87   Pulse: 75 69 72 75   SpO2: 100%  100%        General:   alert, appears stated age and cooperative, no apparent distress   HENT:  normocephalic, atraumatic   Eyes:  extraocular movements and conjunctivae normal   Neck:  supple, range of motion normal, no thyromegaly   Lungs:   no respiratory distress   Heart:   regular rate   Abdomen:  soft, non-tender, non-distended but gravid, no rebound or guarding    Extremities negative edema, negative erythema   FHT: 140s, confirmed on admission                 TOCO: Q 7 minutes   Presentations: cephalic by ultrasound   Cervix:     Dilation: 6cm    Effacement: 80%    Station:  -1    Consistency: soft    Position: middle   Sterile Speculum Exam: +pooling of clear fluid, nitrizine positive    EFW by [facility]: 6.5 lb    Recent Growth Scan: 2021 32w6d 1,547g 6%    Lab Review  Blood Type A POS  GBBS: negative  Rubella: Immune  RPR: non-reactive  (1T)  HIV: negative (1T)  HepB: negative       Assessment:       37w4d weeks gestation with PROM    Active Hospital Problems    Diagnosis  POA    *Pregnancy complicated by multiple fetal congenital anomalies [O35.8XX0]  Yes     CHD, bilat cl/cp, polydactyly, HEB      PROM (premature rupture of membranes) [O42.90]  Unknown    Confirmed trisomy syndrome, fetal, affecting care of mother, antepart [O35.1XX0]  Yes     Trisomy 13      AMA (advanced maternal age) multigravida 35+, third trimester [O09.523]  Yes      Resolved Hospital Problems   No resolved problems to display.          Plan:   1. PROM   Risks, benefits, alternatives and possible complications have been discussed in detail with the patient.   - Consents signed and to chart  - Admit to Labor and Delivery unit  - Epidural per Anesthesia  - Draw CBC, T&S, 3T labs  - Notify Staff  - Recheck in 2 hrs or PRN  - EFW 6.5lb  - Pitocin for labor augmentation  - Post-Partum Hemorrhage risk - low    2. Trisomy 13  - Increased risk for Trisomy 13 based on results from cfDNA  - Confirmed diagnosis via amniocentesis  - Multiple fetal anomalies noted on ultrasound:    1. bilateral cleft lip/palate   2. 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 postaxial polydactyly   4. hyperechoic bowel with liver echogenic foci and scattered  abdominal echogenic foci  - Fetal growth restriction with EFW in 6%  - Patient desires comfort care and has met with palliative care nurse  - Fetal heart tones confirmed on admission; patient does not desire fetal monitoring during labor  - Patient does not want to undergo  delivery for fetal indications    3. History of  delivery  - Patient experienced spontaneous vaginal delivery at 26wga in  with subsequent fetal demise  - Patient reports due to stress, no other etiology identified    4.  Anemia  - Most recent H/H 11.6/35.1  - Will recheck CBC on admit    5. Elevated BP  - Noted in [facility]  - Patient denies history of hypertension  - Will collect PreE labs  - Continue to closely monitor blood pressures    Rafa Braxton M.D.  OB/GYN PGY-2

## 2021-12-21 NOTE — PROGRESS NOTES
LABOR NOTE    S:  Complaints: No.  Epidural working:  Not Applicable.   Resident to bedside for routine cervical exam.    O: BP (!) 150/85 Comment: Pt tearful, upset  Pulse 72   Temp 96.5 °F (35.8 °C) (Temporal)   Resp 18   SpO2 100%       FHT: not being monitored due to fetal trisomy 13   CTX: difficult to discern on TOCO   SVE:       ASSESSMENT:   41 y.o.  at 37w4d, PROM.     FHT reassuring    Active Hospital Problems    Diagnosis  POA    *Pregnancy complicated by multiple fetal congenital anomalies [O35.8XX0]  Yes     CHD, bilat cl/cp, polydactyly, HEB      PROM (premature rupture of membranes) [O42.90]  Unknown    Confirmed trisomy syndrome, fetal, affecting care of mother, antepart [O35.1XX0]  Yes     Trisomy 13      AMA (advanced maternal age) multigravida 35+, third trimester [O09.523]  Yes      Resolved Hospital Problems   No resolved problems to display.     TIMELINE:   1900:   2000:    2345:   0415:   0715:   0915:     PLAN:    1. PROM:   - Recheck in 2 hours or PRN  - Patient declined starting pitocin at last check. Again discussed recommendation of starting pitocin at length given that patient has been ruptured for > 24 hours and risk of developing chorio. Patient would like to wait until next check to start pitocin   - No fetal monitoring 2/2 comfort care only     2. Pre-Eclampsia with Severe Features (BP):   - BP: (107-184)/(61-94) 137/75  - Asymptomatic   - 2+ DTR  - CBC: /303   - CMP: Cr 0.8, AST/ALT 19/16  - P:C Ratio: TLTC   - Magnesium for seizure prophylaxis     Tiffani Lamb MD  PGY-1 OB/GYN

## 2021-12-21 NOTE — PROGRESS NOTES
LABOR NOTE    S:  Complaints: No.  Epidural working:  Not Applicable.   Resident to bedside for routine cervical exam.    O: BP (!) 149/85   Pulse 73   Temp 97 °F (36.1 °C) (Temporal)   Resp 18   SpO2 98%       FHT: not being monitored due to fetal trisomy 13   CTX: rare  SVE: , unchanged from prior exams      ASSESSMENT:   41 y.o.  at 37w4d, PROM.     FHT reassuring    Active Hospital Problems    Diagnosis  POA    *Pregnancy complicated by multiple fetal congenital anomalies [O35.8XX0]  Yes     CHD, bilat cl/cp, polydactyly, HEB      PROM (premature rupture of membranes) [O42.90]  Unknown    Confirmed trisomy syndrome, fetal, affecting care of mother, antepart [O35.1XX0]  Yes     Trisomy 13      AMA (advanced maternal age) multigravida 35+, third trimester [O09.523]  Yes      Resolved Hospital Problems   No resolved problems to display.     TIMELINE:   190: 2000:    2345:   0415:   0715:   0915:   1115:      PLAN:    1. PROM:   - Recheck in 2 hours or PRN  - patient has remained unchanged since 415 this AM   - long discussion with patient regarding recommendation to begin augmentation   - she and her family are contemplating   - No fetal monitoring 2/2 comfort care only     2. Pre-Eclampsia with Severe Features (BP):   - BP: (107-184)/(61-94) 149/85  - Asymptomatic   - 2+ DTR  - CBC: /303   - CMP: Cr 0.8, AST/ALT 19/16  - P:C Ratio: TLTC   - Magnesium for seizure prophylaxis     Rena Ferguson MD   OBGYN  PGY-4

## 2021-12-21 NOTE — PROGRESS NOTES
LABOR NOTE    S:  Complaints: No.  Epidural working:  Not Applicable.   Resident to bedside for routine cervical exam.    O: /63   Pulse 71   Temp 98 °F (36.7 °C) (Temporal)   Resp 18   SpO2 100%       FHT: not being monitored due to fetal trisomy 13   CTX: difficult to discern on TOCO   SVE:       ASSESSMENT:   41 y.o.  at 37w4d, PROM.     FHT reassuring    Active Hospital Problems    Diagnosis  POA    *Pregnancy complicated by multiple fetal congenital anomalies [O35.8XX0]  Yes     CHD, bilat cl/cp, polydactyly, HEB      PROM (premature rupture of membranes) [O42.90]  Unknown    Confirmed trisomy syndrome, fetal, affecting care of mother, antepart [O35.1XX0]  Yes     Trisomy 13      AMA (advanced maternal age) multigravida 35+, third trimester [O09.523]  Yes      Resolved Hospital Problems   No resolved problems to display.     TIMELINE:   1900:   2000:    2345:   0415:     PLAN:    1. PROM:   - Recheck in 1-2 hrs   - No fetal monitoring 2/2 comfort care only     2. Pre-Eclampsia with Severe Features (BP):   - BP: (107-184)/(61-94) 107/63  - Asymptomatic   - 2+ DTR  - CBC: /   - CMP: Cr 0.8, AST/ALT 19/16  - P:C Ratio: TLTC   - Magnesium for seizure prophylaxis         Rafa Braxton M.D.  OB/GYN PGY-2

## 2021-12-21 NOTE — PROGRESS NOTES
"   12/20/21 1937 12/20/21 2000   Vital Signs   BP (!) 164/82 (!) 171/87   MAP (mmHg) 116 121     MD notified of 2 severe BPs. Will obtain 1 additional reading as pt states "anesthesia was talking to me about the epidural and I was nervous." MD aware and ok with this plan.       "

## 2021-12-21 NOTE — PROGRESS NOTES
LABOR NOTE    S:  Complaints: No.  Epidural working:  Not Applicable.   Resident to bedside for routine cervical exam.    O: /73   Pulse 75   Temp 98.6 °F (37 °C)   Resp 18   SpO2 100%       FHT: not being monitored due to fetal trisomy 13   CTX: difficult to discern on TOCO   SVE:       ASSESSMENT:   41 y.o.  at 37w4d, PROM.     FHT reassuring    Active Hospital Problems    Diagnosis  POA    *Pregnancy complicated by multiple fetal congenital anomalies [O35.8XX0]  Yes     CHD, bilat cl/cp, polydactyly, HEB      PROM (premature rupture of membranes) [O42.90]  Unknown    Confirmed trisomy syndrome, fetal, affecting care of mother, antepart [O35.1XX0]  Yes     Trisomy 13      AMA (advanced maternal age) multigravida 35+, third trimester [O09.523]  Yes      Resolved Hospital Problems   No resolved problems to display.     TIMELINE:   1900:   2000:    2345:   0415:   0715:     PLAN:    1. PROM:   - Recheck in 2 hours or PRN  - Plan to start pitocin  - No fetal monitoring 2/2 comfort care only     2. Pre-Eclampsia with Severe Features (BP):   - BP: (107-184)/(61-94) 107/63  - Asymptomatic   - 2+ DTR  - CBC: /303   - CMP: Cr 0.8, AST/ALT 19/16  - P:C Ratio: TLTC   - Magnesium for seizure prophylaxis     Tiffani Lamb MD  PGY-1 OB/GYN

## 2021-12-22 PROBLEM — O42.90 PROM (PREMATURE RUPTURE OF MEMBRANES): Status: RESOLVED | Noted: 2021-12-20 | Resolved: 2021-12-22

## 2021-12-22 LAB
BASOPHILS # BLD AUTO: 0.05 K/UL (ref 0–0.2)
BASOPHILS NFR BLD: 0.4 % (ref 0–1.9)
DIFFERENTIAL METHOD: ABNORMAL
EOSINOPHIL # BLD AUTO: 0.4 K/UL (ref 0–0.5)
EOSINOPHIL NFR BLD: 3.3 % (ref 0–8)
ERYTHROCYTE [DISTWIDTH] IN BLOOD BY AUTOMATED COUNT: 13.6 % (ref 11.5–14.5)
HCT VFR BLD AUTO: 30.2 % (ref 37–48.5)
HGB BLD-MCNC: 9.9 G/DL (ref 12–16)
IMM GRANULOCYTES # BLD AUTO: 0.04 K/UL (ref 0–0.04)
IMM GRANULOCYTES NFR BLD AUTO: 0.4 % (ref 0–0.5)
LYMPHOCYTES # BLD AUTO: 2.4 K/UL (ref 1–4.8)
LYMPHOCYTES NFR BLD: 21.4 % (ref 18–48)
MCH RBC QN AUTO: 30.7 PG (ref 27–31)
MCHC RBC AUTO-ENTMCNC: 32.8 G/DL (ref 32–36)
MCV RBC AUTO: 94 FL (ref 82–98)
MONOCYTES # BLD AUTO: 1.2 K/UL (ref 0.3–1)
MONOCYTES NFR BLD: 10.7 % (ref 4–15)
NEUTROPHILS # BLD AUTO: 7.2 K/UL (ref 1.8–7.7)
NEUTROPHILS NFR BLD: 63.8 % (ref 38–73)
NRBC BLD-RTO: 0 /100 WBC
PLATELET # BLD AUTO: 274 K/UL (ref 150–450)
PMV BLD AUTO: 10.1 FL (ref 9.2–12.9)
RBC # BLD AUTO: 3.23 M/UL (ref 4–5.4)
RPR SER QL: NORMAL
WBC # BLD AUTO: 11.26 K/UL (ref 3.9–12.7)

## 2021-12-22 PROCEDURE — 85025 COMPLETE CBC W/AUTO DIFF WBC: CPT | Performed by: OBSTETRICS & GYNECOLOGY

## 2021-12-22 PROCEDURE — 63600175 PHARM REV CODE 636 W HCPCS: Performed by: OBSTETRICS & GYNECOLOGY

## 2021-12-22 PROCEDURE — 99232 PR SUBSEQUENT HOSPITAL CARE,LEVL II: ICD-10-PCS | Mod: ,,, | Performed by: OBSTETRICS & GYNECOLOGY

## 2021-12-22 PROCEDURE — 63600175 PHARM REV CODE 636 W HCPCS: Performed by: STUDENT IN AN ORGANIZED HEALTH CARE EDUCATION/TRAINING PROGRAM

## 2021-12-22 PROCEDURE — 99232 SBSQ HOSP IP/OBS MODERATE 35: CPT | Mod: ,,, | Performed by: OBSTETRICS & GYNECOLOGY

## 2021-12-22 PROCEDURE — 25000003 PHARM REV CODE 250: Performed by: STUDENT IN AN ORGANIZED HEALTH CARE EDUCATION/TRAINING PROGRAM

## 2021-12-22 PROCEDURE — 36415 COLL VENOUS BLD VENIPUNCTURE: CPT | Performed by: OBSTETRICS & GYNECOLOGY

## 2021-12-22 PROCEDURE — 11000001 HC ACUTE MED/SURG PRIVATE ROOM

## 2021-12-22 RX ORDER — CARBOPROST TROMETHAMINE 250 UG/ML
250 INJECTION, SOLUTION INTRAMUSCULAR
Status: DISCONTINUED | OUTPATIENT
Start: 2021-12-22 | End: 2021-12-23 | Stop reason: HOSPADM

## 2021-12-22 RX ORDER — DIPHENOXYLATE HYDROCHLORIDE AND ATROPINE SULFATE 2.5; .025 MG/1; MG/1
1 TABLET ORAL 4 TIMES DAILY PRN
Status: DISCONTINUED | OUTPATIENT
Start: 2021-12-22 | End: 2021-12-23 | Stop reason: HOSPADM

## 2021-12-22 RX ORDER — CEFTRIAXONE 2 G/50ML
2 INJECTION, SOLUTION INTRAVENOUS ONCE
Status: COMPLETED | OUTPATIENT
Start: 2021-12-22 | End: 2021-12-22

## 2021-12-22 RX ORDER — DOCUSATE SODIUM 100 MG/1
200 CAPSULE, LIQUID FILLED ORAL 2 TIMES DAILY PRN
Qty: 30 CAPSULE | Refills: 1 | Status: SHIPPED | OUTPATIENT
Start: 2021-12-22 | End: 2022-01-27

## 2021-12-22 RX ORDER — ONDANSETRON 8 MG/1
8 TABLET, ORALLY DISINTEGRATING ORAL EVERY 8 HOURS PRN
Status: DISCONTINUED | OUTPATIENT
Start: 2021-12-22 | End: 2021-12-23 | Stop reason: HOSPADM

## 2021-12-22 RX ORDER — PRENATAL WITH FERROUS FUM AND FOLIC ACID 3080; 920; 120; 400; 22; 1.84; 3; 20; 10; 1; 12; 200; 27; 25; 2 [IU]/1; [IU]/1; MG/1; [IU]/1; MG/1; MG/1; MG/1; MG/1; MG/1; MG/1; UG/1; MG/1; MG/1; MG/1; MG/1
1 TABLET ORAL DAILY
Status: DISCONTINUED | OUTPATIENT
Start: 2021-12-22 | End: 2021-12-22

## 2021-12-22 RX ORDER — DOCUSATE SODIUM 100 MG/1
200 CAPSULE, LIQUID FILLED ORAL 2 TIMES DAILY PRN
Status: DISCONTINUED | OUTPATIENT
Start: 2021-12-22 | End: 2021-12-23 | Stop reason: HOSPADM

## 2021-12-22 RX ORDER — LABETALOL 200 MG/1
200 TABLET, FILM COATED ORAL EVERY 12 HOURS
Qty: 60 TABLET | Refills: 11 | Status: SHIPPED | OUTPATIENT
Start: 2021-12-22 | End: 2021-12-23 | Stop reason: HOSPADM

## 2021-12-22 RX ORDER — HYDROCORTISONE 25 MG/G
CREAM TOPICAL 3 TIMES DAILY PRN
Status: DISCONTINUED | OUTPATIENT
Start: 2021-12-22 | End: 2021-12-23 | Stop reason: HOSPADM

## 2021-12-22 RX ORDER — MISOPROSTOL 200 UG/1
800 TABLET ORAL
Status: DISCONTINUED | OUTPATIENT
Start: 2021-12-22 | End: 2021-12-23 | Stop reason: HOSPADM

## 2021-12-22 RX ORDER — LABETALOL 200 MG/1
200 TABLET, FILM COATED ORAL EVERY 12 HOURS
Status: DISCONTINUED | OUTPATIENT
Start: 2021-12-22 | End: 2021-12-23 | Stop reason: HOSPADM

## 2021-12-22 RX ORDER — ACETAMINOPHEN 325 MG/1
650 TABLET ORAL EVERY 6 HOURS PRN
Status: DISCONTINUED | OUTPATIENT
Start: 2021-12-22 | End: 2021-12-23 | Stop reason: HOSPADM

## 2021-12-22 RX ORDER — SIMETHICONE 80 MG
1 TABLET,CHEWABLE ORAL EVERY 6 HOURS PRN
Status: DISCONTINUED | OUTPATIENT
Start: 2021-12-22 | End: 2021-12-23 | Stop reason: HOSPADM

## 2021-12-22 RX ORDER — METHYLERGONOVINE MALEATE 0.2 MG/ML
200 INJECTION INTRAVENOUS
Status: DISCONTINUED | OUTPATIENT
Start: 2021-12-22 | End: 2021-12-23 | Stop reason: HOSPADM

## 2021-12-22 RX ORDER — DIPHENHYDRAMINE HCL 25 MG
25 CAPSULE ORAL EVERY 4 HOURS PRN
Status: DISCONTINUED | OUTPATIENT
Start: 2021-12-22 | End: 2021-12-23 | Stop reason: HOSPADM

## 2021-12-22 RX ORDER — DIPHENHYDRAMINE HYDROCHLORIDE 50 MG/ML
25 INJECTION INTRAMUSCULAR; INTRAVENOUS EVERY 4 HOURS PRN
Status: DISCONTINUED | OUTPATIENT
Start: 2021-12-22 | End: 2021-12-23 | Stop reason: HOSPADM

## 2021-12-22 RX ORDER — SODIUM CHLORIDE 0.9 % (FLUSH) 0.9 %
10 SYRINGE (ML) INJECTION
Status: DISCONTINUED | OUTPATIENT
Start: 2021-12-22 | End: 2021-12-23 | Stop reason: HOSPADM

## 2021-12-22 RX ORDER — HYDROCODONE BITARTRATE AND ACETAMINOPHEN 10; 325 MG/1; MG/1
1 TABLET ORAL EVERY 4 HOURS PRN
Status: DISCONTINUED | OUTPATIENT
Start: 2021-12-22 | End: 2021-12-23 | Stop reason: HOSPADM

## 2021-12-22 RX ORDER — HYDROCODONE BITARTRATE AND ACETAMINOPHEN 5; 325 MG/1; MG/1
1 TABLET ORAL EVERY 4 HOURS PRN
Status: DISCONTINUED | OUTPATIENT
Start: 2021-12-22 | End: 2021-12-23 | Stop reason: HOSPADM

## 2021-12-22 RX ORDER — IBUPROFEN 600 MG/1
600 TABLET ORAL EVERY 6 HOURS PRN
Qty: 30 TABLET | Refills: 1 | Status: SHIPPED | OUTPATIENT
Start: 2021-12-22 | End: 2022-01-27

## 2021-12-22 RX ORDER — PROCHLORPERAZINE EDISYLATE 5 MG/ML
5 INJECTION INTRAMUSCULAR; INTRAVENOUS EVERY 6 HOURS PRN
Status: DISCONTINUED | OUTPATIENT
Start: 2021-12-22 | End: 2021-12-23 | Stop reason: HOSPADM

## 2021-12-22 RX ADMIN — SODIUM CHLORIDE, SODIUM LACTATE, POTASSIUM CHLORIDE, AND CALCIUM CHLORIDE 1000 ML: .6; .31; .03; .02 INJECTION, SOLUTION INTRAVENOUS at 11:12

## 2021-12-22 RX ADMIN — IBUPROFEN 600 MG: 600 TABLET ORAL at 11:12

## 2021-12-22 RX ADMIN — IBUPROFEN 600 MG: 600 TABLET ORAL at 12:12

## 2021-12-22 RX ADMIN — DOCUSATE SODIUM 200 MG: 100 CAPSULE ORAL at 09:12

## 2021-12-22 RX ADMIN — LABETALOL HYDROCHLORIDE 200 MG: 200 TABLET, FILM COATED ORAL at 09:12

## 2021-12-22 RX ADMIN — IBUPROFEN 600 MG: 600 TABLET ORAL at 06:12

## 2021-12-22 RX ADMIN — CEFTRIAXONE 2 G: 2 INJECTION, SOLUTION INTRAVENOUS at 10:12

## 2021-12-22 RX ADMIN — MAGNESIUM SULFATE IN WATER 2 G/HR: 40 INJECTION, SOLUTION INTRAVENOUS at 11:12

## 2021-12-22 NOTE — CARE UPDATE
Called to bedside by nursing staff for evaluation. Patient reportedly up to the restroom and felt unsteady on her feet. She denies any dizziness, CP, or palpitations. Reports that she thinks she just was not quite ready to ambulate yet. Endorses lower pelvic cramping despite ibuprofen. Feels that she really needs to urinate which may be contributing to cramping pain    VSS as follows:  BP: 153/90  HR: 78  Sat: 100% on RA    Exam: Lying in bed, appears comfortable  CV: RR  Pulm: Non-labored respirations  Ab: Fundus firm in the midline, no vaginal bleeding noted with deep palpation at uterine fundus  Reflexes 2+ in BUE per nursing staff    A/P:  - VSS, exam reassuring, minimal blood on pad/bed  - Continue MgSO4 for seizure prophylaxis, no S/S of MgSO4 toxicity  - Will have patient empty bladder on bedpan and re-assess cramping pain    Siomara Ríos M.D.  OB/GYN PGY-3

## 2021-12-22 NOTE — PROGRESS NOTES
POSTPARTUM PROGRESS NOTE    Subjective:     PPD/POD#: 1   Procedure:    EGA: 37w5d   N/V: No   F/C: No   Abd Pain: Mild, well-controlled with oral pain medication   Lochia: Mild   Voiding: Yes   Ambulating: Yes   Bowel fnc: Yes   Breastfeeding: N/a   Contraception: Per primary OB   Circumcision: n/a, fetal demise     Objective:      Temp:  [96.5 °F (35.8 °C)-98.5 °F (36.9 °C)] 98.5 °F (36.9 °C)  Pulse:  [59-89] 83  Resp:  [18] 18  SpO2:  [95 %-100 %] 99 %  BP: (115-157)/(66-94) 147/82    Lung: Normal respiratory effort   Abdomen: Soft, appropriately tender   Uterus: Firm, no fundal tenderness   Incision: N/A   : Deferred   Extremities: Bilateral trace edema     Lab Review    Recent Labs   Lab 21  1852   *   K 4.2      CO2 17*   BUN 12   CREATININE 0.8   GLU 73   PROT 7.1   BILITOT 0.4   ALKPHOS 213*   ALT 16   AST 19       Recent Labs   Lab 21  1852   WBC 8.93   HGB 11.6*   HCT 35.1*   MCV 93            I/O    Intake/Output Summary (Last 24 hours) at 2021 0628  Last data filed at 2021 0029  Gross per 24 hour   Intake 2729.67 ml   Output 3290 ml   Net -560.33 ml        Assessment and Plan:   Postpartum care:  - Patient doing well.  - Continue routine management and advances.    PreE w/SF  - BP as above  - asymptomatic  - preE labs as above  - UOP: 1.8 cc/kg/hr  - Mag: continue  - Hypertensive agent labetalol 200 mg BID    Fetal Demise  - Trisomy 13  - Will coordinate 1-2 week postpartum mood check    Yanira Olsen MD/MPH  OB/GYN PGY2

## 2021-12-22 NOTE — CARE UPDATE
Patient able to urinate on bed pan (600 cc) per nursing staff, now feeling much improved. VS remain stable with minimal bleeding. Patient will plan to have dinner and then move to MBU.    Siomara Ríos M.D.  OB/GYN PGY-3

## 2021-12-22 NOTE — PLAN OF CARE
VSS. Pain well controlled with oral pain medication. Continuous magnesium sulfate and LR drip infusing. Pt ambulating with assistance and voiding without difficulty. Fundus midline, firm, with light lochia. Patient safety maintained, side rails up, bed low and locked position. Significant other and family at bedside. Infant remains with parents after demise, pt holding and bonding with her. Will continue to round as needed.

## 2021-12-23 VITALS
RESPIRATION RATE: 18 BRPM | HEIGHT: 64 IN | HEART RATE: 67 BPM | SYSTOLIC BLOOD PRESSURE: 133 MMHG | DIASTOLIC BLOOD PRESSURE: 92 MMHG | OXYGEN SATURATION: 95 % | WEIGHT: 159.81 LBS | TEMPERATURE: 98 F | BODY MASS INDEX: 27.28 KG/M2

## 2021-12-23 PROCEDURE — 25000003 PHARM REV CODE 250: Performed by: STUDENT IN AN ORGANIZED HEALTH CARE EDUCATION/TRAINING PROGRAM

## 2021-12-23 PROCEDURE — 99238 HOSP IP/OBS DSCHRG MGMT 30/<: CPT | Mod: ,,, | Performed by: OBSTETRICS & GYNECOLOGY

## 2021-12-23 PROCEDURE — 99238 PR HOSPITAL DISCHARGE DAY,<30 MIN: ICD-10-PCS | Mod: ,,, | Performed by: OBSTETRICS & GYNECOLOGY

## 2021-12-23 RX ADMIN — IBUPROFEN 600 MG: 600 TABLET ORAL at 12:12

## 2021-12-23 NOTE — NURSING
1100-Discharge paperwork gone over. All questions & concerns addressed. Prescriptions given. No sign of distress. Denies further needs at this time. Pt to be discharged home via transport.

## 2021-12-23 NOTE — NURSING
NICU Palliative Care Note    Spoke with mom, dad and multiple family members @ bedside.  Offered comfort and support.  Discussed options for arrangements: burial vs cremation.  Parents encouraged to make decision after Jyothi.

## 2021-12-23 NOTE — NURSING
Spoke to mom and dad @ bedside.  Dad states they finally were able to get some good rest.  Mom being discharged today from hospital. Offered comfort and support.

## 2021-12-23 NOTE — PROGRESS NOTES
POSTPARTUM PROGRESS NOTE    Subjective:     PPD/POD#: 2   Procedure:    EGA: 37w5d   N/V: No   F/C: No   Abd Pain: Mild, well-controlled with oral pain medication   Lochia: Mild   Voiding: Yes   Ambulating: Yes   Bowel fnc: Yes   Breastfeeding: N/a   Contraception: Per primary OB   Circumcision: n/a, fetal demise     Objective:      Temp:  [97.8 °F (36.6 °C)-98.6 °F (37 °C)] 98.2 °F (36.8 °C)  Pulse:  [71-94] 74  Resp:  [17-] 17  SpO2:  [98 %-100 %] 98 %  BP: ()/(55-88) 128/76    Lung: Normal respiratory effort   Abdomen: Soft, appropriately tender   Uterus: Firm, no fundal tenderness   Incision: N/A   : Deferred   Extremities: Bilateral trace edema     Lab Review    Recent Labs   Lab 21  1852   *   K 4.2      CO2 17*   BUN 12   CREATININE 0.8   GLU 73   PROT 7.1   BILITOT 0.4   ALKPHOS 213*   ALT 16   AST 19       Recent Labs   Lab 21  1852 21  0951   WBC 8.93 11.26   HGB 11.6* 9.9*   HCT 35.1* 30.2*   MCV 93 94    274         I/O    Intake/Output Summary (Last 24 hours) at 2021 0620  Last data filed at 2021 1430  Gross per 24 hour   Intake --   Output 1800 ml   Net -1800 ml        Assessment and Plan:   Postpartum care:  - Patient doing well.  - Continue routine management and advances.    PreE w/SF  -BP: ()/(55-88) 128/76  - asymptomatic  - preE labs as above  - UOP: 24 ml/kg  - Mag: continue  - Hypertensive agent labetalol 200 mg started PPD1, discontinued after as patietn BP  systolic    Fetal Demise  - Trisomy 13  - Will coordinate 1-2 week postpartum mood check    Josue Hong MD  OBGYN PGY-3

## 2021-12-23 NOTE — PLAN OF CARE
Problem:  Fall Injury Risk  Goal: Absence of Fall, Infant Drop and Related Injury  Outcome: Met     Problem: Adult Inpatient Plan of Care  Goal: Plan of Care Review  Outcome: Met  Goal: Patient-Specific Goal (Individualized)  Outcome: Met  Goal: Absence of Hospital-Acquired Illness or Injury  Outcome: Met  Goal: Optimal Comfort and Wellbeing  Outcome: Met  Goal: Readiness for Transition of Care  Outcome: Met     Problem: Infection  Goal: Absence of Infection Signs and Symptoms  Outcome: Met     Problem: Hypertensive Disorders in Pregnancy  Goal: Maternal-Fetal Stabilization  Outcome: Met     Problem: Grieving  Goal: Expression of Grief  Outcome: Met     Problem: Adjustment to Role Transition (Postpartum Vaginal Delivery)  Goal: Successful Maternal Role Transition  Outcome: Met     Problem: Bleeding (Postpartum Vaginal Delivery)  Goal: Hemostasis  Outcome: Met     Problem: Infection (Postpartum Vaginal Delivery)  Goal: Absence of Infection Signs/Symptoms  Outcome: Met     Problem: Pain (Postpartum Vaginal Delivery)  Goal: Acceptable Pain Control  Outcome: Met     Problem: Urinary Retention (Postpartum Vaginal Delivery)  Goal: Effective Urinary Elimination  Outcome: Met

## 2021-12-23 NOTE — DISCHARGE SUMMARY
Delivery Discharge Summary  Obstetrics      Primary OB Clinician: NATANAEL Cruz MD      Admission date: 2021  Discharge date: 2021    Disposition: To home, self care    Discharge Diagnosis List:      Patient Active Problem List   Diagnosis    Hip pain    AMANDA III (cervical intraepithelial neoplasia grade III) with severe dysplasia    AMA (advanced maternal age) multigravida 35+, third trimester    Abnormal genetic test during pregnancy    Pregnancy complicated by multiple fetal congenital anomalies    Confirmed trisomy syndrome, fetal, affecting care of mother, antepart       Procedure:     Hospital Course:  Gretta Carson is a 41 y.o. now , PPD #2 who was admitted on 2021 at 37w4d for PROM. This IUP is complicated by trisomy 13 w/ multiple fetal anomalies (CHD, bilateral cleft lip/palate, polydactyly, HEB), AMA, anemia.     Patient was subsequently admitted to labor and delivery unit with signed consents.     Labor course was complicated by development of Pre E with SF( Bp) and started on magnesium for seizure ppx. Her labor resulted in  with manual extraction of the placenta. Please see delivery note for further details.     Her postpartum course was complicated by continued treated of preE with SF with magnesium sulfate for 24 hours postpartum. Patient stated on oral labetalol 200 PPD1, but stopped after am dose as -110 systolic. Did not discharge home with medication.     On discharge day, patient's pain is controlled with oral pain medications. Pt is tolerating ambulation without SOB or CP, and regular diet without N/V. Reports lochia is ild. Denies any HA, vision changes, F/C, LE swelling. Denies any breast pain/soreness.    Pt in stable condition and ready for discharge. She has been instructed to start and/or continue medications and follow up with her obstetrics provider as listed below.    Pertinent studies:  CBC  Recent Labs   Lab 21  1852 21  9314    WBC 8.93 11.26   HGB 11.6* 9.9*   HCT 35.1* 30.2*   MCV 93 94    274          There is no immunization history for the selected administration types on file for this patient.     Delivery:    Episiotomy: None   Lacerations: 1st   Repair suture:     Repair # of packets: 0   Blood loss (ml):       Birth information:  YOB: 2019   Time of birth: 4:57 PM   Sex: female   Delivery type: Vaginal, Spontaneous   Gestational Age: 37w5d    Delivery Clinician:      Other providers:       Additional  information:  Forceps:    Vacuum:    Breech:    Observed anomalies      Living?:           APGARS  One minute Five minutes Ten minutes   Skin color:         Heart rate:         Grimace:         Muscle tone:         Breathing:         Totals: 7  8        Placenta: Delivered:       appearance      Patient Instructions:   Current Discharge Medication List      START taking these medications    Details   docusate sodium (COLACE) 100 MG capsule Take 2 capsules (200 mg total) by mouth 2 (two) times daily as needed for Constipation.  Qty: 30 capsule, Refills: 1      ibuprofen (ADVIL,MOTRIN) 600 MG tablet Take 1 tablet (600 mg total) by mouth every 6 (six) hours as needed (cramping).  Qty: 30 tablet, Refills: 1         CONTINUE these medications which have NOT CHANGED    Details   aspirin 81 MG Chew Take 1 tablet (81 mg total) by mouth once daily.  Qty: 100 tablet, Refills: 3      PNV72-iron carb,glu-FA-dss-dha (CITRANATAL 90 DHA, ALGAL OIL,) 90 mg iron-1 mg -50 mg-300 mg Cmpk Take 1 tablet by mouth once daily.  Qty: 100 each, Refills: 3             Discharge Procedure Orders   Pelvic Rest     Notify your health care provider if you experience any of the following:   Order Comments: Vaginal bleeding more than 1 soaked pad per hour for 2 hours     Notify your health care provider if you experience any of the following:  increased confusion or weakness     Notify your health care provider if you experience any of the  following:  persistent dizziness, light-headedness, or visual disturbances     Notify your health care provider if you experience any of the following:  worsening rash     Notify your health care provider if you experience any of the following:  severe persistent headache     Notify your health care provider if you experience any of the following:  difficulty breathing or increased cough     Notify your health care provider if you experience any of the following:  severe uncontrolled pain     Notify your health care provider if you experience any of the following:  redness, tenderness, or signs of infection (pain, swelling, redness, odor or green/yellow discharge around incision site)     Notify your health care provider if you experience any of the following:  persistent nausea and vomiting or diarrhea     Notify your health care provider if you experience any of the following:  temperature >100.4     Activity as tolerated        Follow-up Information     Willis Maria MD In 6 weeks.    Specialties: Obstetrics and Gynecology, Obstetrics, Gynecology  Why: Post partum              Willis Maria MD in 1 week   Specialties: Obstetrics and Gynecology, Obstetrics, Gynecology  Why: BP check                           Josue Hong MD  OBGYN PGY-3    I have reviewed and concur with the resident's documentation and discharge summary.      Karthik Stout Jr., MD  Maternal Fetal Medicine

## 2021-12-23 NOTE — PROGRESS NOTES
HTN education given with handout, pt states understanding and will follow up with OB for a BP check next week.

## 2021-12-23 NOTE — PLAN OF CARE
VSS. Pt ambulating, voiding, and passing flatus. Pain controlled throughout shift with PO medications.  Plan of care reviewed with pt at the beginning of the shift. Pt v/u of plan of care; questions answered.

## 2021-12-27 ENCOUNTER — PATIENT MESSAGE (OUTPATIENT)
Dept: OBSTETRICS AND GYNECOLOGY | Facility: CLINIC | Age: 41
End: 2021-12-27
Payer: OTHER GOVERNMENT

## 2021-12-29 LAB
FINAL PATHOLOGIC DIAGNOSIS: NORMAL
Lab: NORMAL

## 2021-12-30 ENCOUNTER — POSTPARTUM VISIT (OUTPATIENT)
Dept: OBSTETRICS AND GYNECOLOGY | Facility: CLINIC | Age: 41
End: 2021-12-30
Payer: OTHER GOVERNMENT

## 2021-12-30 VITALS
BODY MASS INDEX: 24.77 KG/M2 | WEIGHT: 145.06 LBS | DIASTOLIC BLOOD PRESSURE: 66 MMHG | HEIGHT: 64 IN | SYSTOLIC BLOOD PRESSURE: 118 MMHG

## 2021-12-30 PROCEDURE — 99999 PR PBB SHADOW E&M-EST. PATIENT-LVL III: ICD-10-PCS | Mod: PBBFAC,,, | Performed by: SPECIALIST

## 2021-12-30 PROCEDURE — 99999 PR PBB SHADOW E&M-EST. PATIENT-LVL III: CPT | Mod: PBBFAC,,, | Performed by: SPECIALIST

## 2021-12-30 PROCEDURE — 99213 OFFICE O/P EST LOW 20 MIN: CPT | Mod: PBBFAC,PN | Performed by: SPECIALIST

## 2021-12-30 PROCEDURE — 0503F PR POSTPARTUM CARE VISIT: ICD-10-PCS | Mod: ,,, | Performed by: SPECIALIST

## 2021-12-30 PROCEDURE — 0503F POSTPARTUM CARE VISIT: CPT | Mod: ,,, | Performed by: SPECIALIST

## 2022-01-27 ENCOUNTER — POSTPARTUM VISIT (OUTPATIENT)
Dept: OBSTETRICS AND GYNECOLOGY | Facility: CLINIC | Age: 42
End: 2022-01-27
Payer: OTHER GOVERNMENT

## 2022-01-27 VITALS
HEIGHT: 64 IN | BODY MASS INDEX: 24.77 KG/M2 | DIASTOLIC BLOOD PRESSURE: 72 MMHG | SYSTOLIC BLOOD PRESSURE: 114 MMHG | WEIGHT: 145.06 LBS

## 2022-01-27 PROCEDURE — 99213 OFFICE O/P EST LOW 20 MIN: CPT | Mod: PBBFAC,PN | Performed by: SPECIALIST

## 2022-01-27 PROCEDURE — 99999 PR PBB SHADOW E&M-EST. PATIENT-LVL III: CPT | Mod: PBBFAC,,, | Performed by: SPECIALIST

## 2022-01-27 PROCEDURE — 0503F POSTPARTUM CARE VISIT: CPT | Mod: ,,, | Performed by: SPECIALIST

## 2022-01-27 PROCEDURE — 99999 PR PBB SHADOW E&M-EST. PATIENT-LVL III: ICD-10-PCS | Mod: PBBFAC,,, | Performed by: SPECIALIST

## 2022-01-27 PROCEDURE — 0503F PR POSTPARTUM CARE VISIT: ICD-10-PCS | Mod: ,,, | Performed by: SPECIALIST

## 2022-01-27 NOTE — PROGRESS NOTES
42 yo BF s/p vaginal delivery induced at [facility] T13. Pt doing well and denies menorrhagia, f/c, n/v. PP depression screening negative  Discussed future pregnancy and aneuploidy risks AMA related.  Past Medical History:   Diagnosis Date    Abnormal Pap smear of cervix     PTSD (post-traumatic stress disorder)        Past Surgical History:   Procedure Laterality Date    CERVICAL BIOPSY  W/ LOOP ELECTRODE EXCISION      COLPOSCOPY      HERNIA REPAIR         Family History   Problem Relation Age of Onset    Breast cancer Neg Hx     Miscarriages / Stillbirths Neg Hx        Social History     Socioeconomic History    Marital status:    Tobacco Use    Smoking status: Never Smoker    Smokeless tobacco: Never Used   Substance and Sexual Activity    Alcohol use: No    Drug use: No    Sexual activity: Not Currently     Partners: Male     Birth control/protection: None     Social Determinants of Health     Financial Resource Strain: Low Risk     Difficulty of Paying Living Expenses: Not hard at all   Food Insecurity: No Food Insecurity    Worried About Running Out of Food in the Last Year: Never true    Ran Out of Food in the Last Year: Never true   Transportation Needs: No Transportation Needs    Lack of Transportation (Medical): No    Lack of Transportation (Non-Medical): No   Physical Activity: Sufficiently Active    Days of Exercise per Week: 3 days    Minutes of Exercise per Session: 50 min   Stress: Stress Concern Present    Feeling of Stress : To some extent   Social Connections: Unknown    Frequency of Communication with Friends and Family: More than three times a week    Frequency of Social Gatherings with Friends and Family: Twice a week    Active Member of Clubs or Organizations: Yes    Attends Club or Organization Meetings: More than 4 times per year    Marital Status:    Housing Stability: Low Risk     Unable to Pay for Housing in the Last Year: No    Number of Places Lived  in the Last Year: 1    Unstable Housing in the Last Year: No       Current Outpatient Medications   Medication Sig Dispense Refill    aspirin 81 MG Chew Take 1 tablet (81 mg total) by mouth once daily. (Patient not taking: No sig reported) 100 tablet 3     No current facility-administered medications for this visit.       Review of patient's allergies indicates:  No Known Allergies    Review of System:   General: no chills, fever, night sweats, weight gain or weight loss  Psychological: no depression or suicidal ideation  Breasts: no new or changing breast lumps, nipple discharge or masses.  Respiratory: no cough, shortness of breath, or wheezing  Cardiovascular: no chest pain or dyspnea on exertion  Gastrointestinal: no abdominal pain, change in bowel habits, or black or bloody stools  Genito-Urinary: no incontinence, urinary frequency/urgency or vulvar/vaginal symptoms, pelvic pain or abnormal vaginal bleeding.  Musculoskeletal: no gait disturbance or muscular weakness                                              General Appearance    A and O x 4, Cooperative, no distress   Breasts    Abdomen   Deferred  Soft, non-tender, bowel sounds active all four quadrants,  no masses, no organomegaly    Genitourinary:   External rectal exam shows no thrombosed external hemorrhoids.   Pelvic exam was performed with patient supine.  No labial fusion.  There is no rash, lesion or injury on the right labia.  There is no rash, lesion or injury on the left labia.  No bleeding and no signs of injury around the vaginal introitus, urethra is without lesions and well supported. The cervix is visualized with no discharge, lesions or friability.  No vaginal discharge found.  No significant Cystocele, Enterocele or rectocele, and uterus well supported.  Bimanual exam:  The urethra is normal to palpation and there are no palpable vaginal wall masses.  Uterus is not deviated, not enlarged, not fixed, normal shape and not tender.  Cervix  exhibits no motion tenderness.   Right adnexum displays no mass and no tenderness.  Left adnexum displays no mass and no tenderness.   Extremities: Extremities normal, atraumatic, no cyanosis or edema                     NOTE  NURSING PERSONAL PRESENT FOR ENTIRE PHYSICAL EXAM    Plan Declines contraception  No s/s PP PIH  RTO 7/22 /prn  Answered all questions

## 2022-07-07 ENCOUNTER — OFFICE VISIT (OUTPATIENT)
Dept: OBSTETRICS AND GYNECOLOGY | Facility: CLINIC | Age: 42
End: 2022-07-07
Payer: OTHER GOVERNMENT

## 2022-07-07 VITALS
HEIGHT: 64 IN | BODY MASS INDEX: 24.17 KG/M2 | WEIGHT: 141.56 LBS | DIASTOLIC BLOOD PRESSURE: 82 MMHG | SYSTOLIC BLOOD PRESSURE: 114 MMHG

## 2022-07-07 DIAGNOSIS — Z01.419 ENCOUNTER FOR GYNECOLOGICAL EXAMINATION: Primary | ICD-10-CM

## 2022-07-07 DIAGNOSIS — Z30.013 ENCOUNTER FOR INITIAL PRESCRIPTION OF INJECTABLE CONTRACEPTIVE: ICD-10-CM

## 2022-07-07 LAB
B-HCG UR QL: NEGATIVE
CTP QC/QA: YES

## 2022-07-07 PROCEDURE — 99999 PR PBB SHADOW E&M-EST. PATIENT-LVL III: ICD-10-PCS | Mod: PBBFAC,,, | Performed by: SPECIALIST

## 2022-07-07 PROCEDURE — 99999 PR PBB SHADOW E&M-EST. PATIENT-LVL III: CPT | Mod: PBBFAC,,, | Performed by: SPECIALIST

## 2022-07-07 PROCEDURE — 81025 URINE PREGNANCY TEST: CPT | Mod: PBBFAC,PN | Performed by: SPECIALIST

## 2022-07-07 PROCEDURE — 99395 PREV VISIT EST AGE 18-39: CPT | Mod: S$PBB,ICN,, | Performed by: SPECIALIST

## 2022-07-07 PROCEDURE — 96372 THER/PROPH/DIAG INJ SC/IM: CPT | Mod: PBBFAC,PN

## 2022-07-07 PROCEDURE — 88175 CYTOPATH C/V AUTO FLUID REDO: CPT | Performed by: SPECIALIST

## 2022-07-07 PROCEDURE — 99395 PR PREVENTIVE VISIT,EST,18-39: ICD-10-PCS | Mod: S$PBB,ICN,, | Performed by: SPECIALIST

## 2022-07-07 PROCEDURE — 99213 OFFICE O/P EST LOW 20 MIN: CPT | Mod: 25,PBBFAC,PN | Performed by: SPECIALIST

## 2022-07-07 RX ORDER — MEDROXYPROGESTERONE ACETATE 150 MG/ML
150 INJECTION, SUSPENSION INTRAMUSCULAR
Status: COMPLETED | OUTPATIENT
Start: 2022-07-07 | End: 2022-07-07

## 2022-07-07 RX ADMIN — MEDROXYPROGESTERONE ACETATE 150 MG: 150 INJECTION, SUSPENSION INTRAMUSCULAR at 01:07

## 2022-07-07 NOTE — PROGRESS NOTES
40 yo BF presents for annual gyn eval  Pt doing well  Occasional abdominal pressure with Valsalva typer exercise, ie rope climbing  Requests to resume DPMA which she has used in the past  Past Medical History:   Diagnosis Date    Abnormal Pap smear of cervix     PTSD (post-traumatic stress disorder)        Past Surgical History:   Procedure Laterality Date    CERVICAL BIOPSY  W/ LOOP ELECTRODE EXCISION      COLPOSCOPY      HERNIA REPAIR         Family History   Problem Relation Age of Onset    Breast cancer Neg Hx     Miscarriages / Stillbirths Neg Hx        Social History     Socioeconomic History    Marital status:    Tobacco Use    Smoking status: Never Smoker    Smokeless tobacco: Never Used   Substance and Sexual Activity    Alcohol use: No    Drug use: No    Sexual activity: Not Currently     Partners: Male     Birth control/protection: None     Social Determinants of Health     Financial Resource Strain: Low Risk     Difficulty of Paying Living Expenses: Not hard at all   Food Insecurity: No Food Insecurity    Worried About Running Out of Food in the Last Year: Never true    Ran Out of Food in the Last Year: Never true   Transportation Needs: No Transportation Needs    Lack of Transportation (Medical): No    Lack of Transportation (Non-Medical): No   Physical Activity: Sufficiently Active    Days of Exercise per Week: 3 days    Minutes of Exercise per Session: 50 min   Stress: Stress Concern Present    Feeling of Stress : To some extent   Social Connections: Unknown    Frequency of Communication with Friends and Family: More than three times a week    Frequency of Social Gatherings with Friends and Family: Twice a week    Active Member of Clubs or Organizations: Yes    Attends Club or Organization Meetings: More than 4 times per year    Marital Status:    Housing Stability: Low Risk     Unable to Pay for Housing in the Last Year: No    Number of Places Lived in the  Last Year: 1    Unstable Housing in the Last Year: No       Current Outpatient Medications   Medication Sig Dispense Refill    aspirin 81 MG Chew Take 1 tablet (81 mg total) by mouth once daily. (Patient not taking: No sig reported) 100 tablet 3     Current Facility-Administered Medications   Medication Dose Route Frequency Provider Last Rate Last Admin    medroxyPROGESTERone (DEPO-PROVERA) injection 150 mg  150 mg Intramuscular 1 time in Clinic/HOD Willis Maria MD           Review of patient's allergies indicates:  No Known Allergies    Review of System:   General: no chills, fever, night sweats, weight gain or weight loss  Psychological: no depression or suicidal ideation  Breasts: no new or changing breast lumps, nipple discharge or masses.  Respiratory: no cough, shortness of breath, or wheezing  Cardiovascular: no chest pain or dyspnea on exertion  Gastrointestinal: no abdominal pain, change in bowel habits, or black or bloody stools  Genito-Urinary: no incontinence, urinary frequency/urgency or vulvar/vaginal symptoms, pelvic pain or abnormal vaginal bleeding.  Musculoskeletal: no gait disturbance or muscular weakness                                              General Appearance    A and O x 4, Cooperative, no distress   Breasts    Abdomen   Deferred  Soft, non-tender, bowel sounds active all four quadrants,  no masses, no organomegaly  Mild diastasis noted on Valsalva    Genitourinary:   External rectal exam shows no thrombosed external hemorrhoids.   Pelvic exam was performed with patient supine.  No labial fusion.  There is no rash, lesion or injury on the right labia.  There is no rash, lesion or injury on the left labia.  No bleeding and no signs of injury around the vaginal introitus, urethra is without lesions and well supported. The cervix is visualized with no discharge, lesions or friability.  No vaginal discharge found.  No significant Cystocele, Enterocele or rectocele, and uterus well  supported.  Bimanual exam:  The urethra is normal to palpation and there are no palpable vaginal wall masses.  Uterus is not deviated, not enlarged, not fixed, normal shape and not tender.  Cervix exhibits no motion tenderness.   Right adnexum displays no mass and no tenderness.  Left adnexum displays no mass and no tenderness.   Extremities: Extremities normal, atraumatic, no cyanosis or edema                     NOTE  NURSING PERSONAL PRESENT FOR ENTIRE PHYSICAL EXAM    PAP submitted    Discussed diastasis and supportive care  BSE monthly  Screening mammogram age 42  DPMA 150mg to and q 90 days  RTO 1 year/prn

## 2022-07-13 LAB
FINAL PATHOLOGIC DIAGNOSIS: NORMAL
Lab: NORMAL

## 2022-12-08 ENCOUNTER — TELEPHONE (OUTPATIENT)
Dept: OBSTETRICS AND GYNECOLOGY | Facility: CLINIC | Age: 42
End: 2022-12-08
Payer: OTHER GOVERNMENT

## 2022-12-08 NOTE — TELEPHONE ENCOUNTER
----- Message from Josesito Kirkland, Patient Care Assistant sent at 12/8/2022  9:57 AM CST -----  Contact: [facility]/Kendal  Type: Needs Medical Advice    Who Called: [facility]/Kendal  Best Call Back Number: [phone]  Fax: [phone]  Inquiry/Question: Kendal is calling to see if a retro auth can be done on date of service 07/08/21. There is no [facility] showing from the [facility] on the services provided on this date for her appt or labs. Please advise.Thank you~

## 2023-02-27 ENCOUNTER — TELEPHONE (OUTPATIENT)
Dept: OBSTETRICS AND GYNECOLOGY | Facility: CLINIC | Age: 43
End: 2023-02-27
Payer: OTHER GOVERNMENT

## 2023-02-27 NOTE — TELEPHONE ENCOUNTER
----- Message from Elie Mora sent at 2/27/2023 10:38 AM CST -----  Contact: Patient  Type:  Patient Call          Who Called: patient         Does the patient know what this is regarding?: Requesting a call back to have a referral for therapy ; please advise           Would the patient rather a call back or a response via [facility]? Call           Best Call Back Number: [phone]             Additional Information:

## 2023-03-01 DIAGNOSIS — M62.08 DIASTASIS RECTI: Primary | ICD-10-CM

## 2023-03-23 ENCOUNTER — PATIENT MESSAGE (OUTPATIENT)
Dept: OBSTETRICS AND GYNECOLOGY | Facility: CLINIC | Age: 43
End: 2023-03-23
Payer: OTHER GOVERNMENT

## 2023-04-11 ENCOUNTER — PATIENT MESSAGE (OUTPATIENT)
Dept: OBSTETRICS AND GYNECOLOGY | Facility: CLINIC | Age: 43
End: 2023-04-11
Payer: OTHER GOVERNMENT

## 2023-05-01 ENCOUNTER — TELEPHONE (OUTPATIENT)
Dept: OBSTETRICS AND GYNECOLOGY | Facility: CLINIC | Age: 43
End: 2023-05-01
Payer: OTHER GOVERNMENT

## 2023-05-01 NOTE — TELEPHONE ENCOUNTER
----- Message from Gayle Art sent at 5/1/2023  2:01 PM CDT -----  Regarding: needs return call  Type: Needs Medical Advice  Who Called:  Gretta    Patrice Call Back Number: [phone]    Additional Information: Needs to speak to someone in office regarding [facility], she states she thinks there is a misunderstanding and needs to speak to the office please advise.

## 2023-05-18 ENCOUNTER — TELEPHONE (OUTPATIENT)
Dept: OBSTETRICS AND GYNECOLOGY | Facility: CLINIC | Age: 43
End: 2023-05-18
Payer: OTHER GOVERNMENT

## 2023-05-18 DIAGNOSIS — M62.08 DIASTASIS RECTI: Primary | ICD-10-CM

## 2023-05-18 NOTE — TELEPHONE ENCOUNTER
----- Message from Yee Solano sent at 5/18/2023 12:45 PM CDT -----  Contact: pt [phone]  Type: Needs Medical Advice  Who Called:  Pt     Best Call Back Number: [phone]    Additional Information: Pt requesting a call back to discuss referral for physical therapy for pelvic floor. Pls call back and advise

## 2023-05-18 NOTE — TELEPHONE ENCOUNTER
Spoke with patient. Referral and auth needed for Pelvic floor therapy. Patient already referred and had to cancel due to no auth. Please resubmit.

## 2023-06-27 ENCOUNTER — CLINICAL SUPPORT (OUTPATIENT)
Dept: REHABILITATION | Facility: HOSPITAL | Age: 43
End: 2023-06-27
Payer: OTHER GOVERNMENT

## 2023-06-27 DIAGNOSIS — M62.08 DIASTASIS RECTI: ICD-10-CM

## 2023-06-27 PROCEDURE — 97530 THERAPEUTIC ACTIVITIES: CPT | Mod: PN

## 2023-06-27 PROCEDURE — 97161 PT EVAL LOW COMPLEX 20 MIN: CPT | Mod: PN

## 2023-06-27 NOTE — PLAN OF CARE
[facility]   Pelvic Health Physical Therapy Initial Evaluation     Date: 2023   Name: Gretta Casron  Clinic Number: [id number]    Therapy Diagnosis:   Encounter Diagnosis   Name Primary?    Diastasis recti      Physician: Willis Maria MD    Physician Orders: PT Eval and Treat   Medical Diagnosis from Referral: M62.08 (ICD-10-CM) - Diastasis recti  Evaluation Date: 2023  Authorization Period Expiration: 2024  Plan of Care Expiration: eval only  Visit # / Visits authorized:    FOTO: Issued Visit #:      Precautions: Standard    Time In: 11:05 am  Time Out: 11:36 am   Total Appointment Time (timed & untimed codes): 31 minutes    SUBJECTIVE     Date of onset: 18 months ago     History of current condition - Gretta reports: she is a , so this is affecting her abilities in this field.  She feels like her stomach gets big sometimes and is not going back to how it should be. Pt states she has never had a regular period. She had it the last two months which is not her normal.     OB/GYN History: , denies stitches   Sexually active? Yes, no pain   Pain with vaginal exams, intercourse or tampon use? None    Bladder/Bowel History:   Frequency of urination:   Daytime: every few hours            Nighttime: not often   Pain:  None     Imaging : none for this condition    Prior Therapy: physical therapy for left hip (labral tear dysfunction and snapping). Also goes to chiropractor for hip pain weekly. No pelvic floor physical therapy in the past.   Social History:  lives with their spouse  Current exercise: at least 6 days/week  Occupation: Patient works as a  .  Prior Level of Function: independent   Current Level of Function: independent     Types of fluid intake: 64 -90+ oz water daily   Diet: eats plenty of protein, fruits and vegetables   Habitus: well developed, well nourished  Abuse/Neglect: No     Patients goals: getting diastasis recti under  control      Medical History: Gretta  has a past medical history of Abnormal Pap smear of cervix and PTSD (post-traumatic stress disorder).     Surgical History: Gretta Carson  has a past surgical history that includes Hernia repair; Colposcopy; and Cervical biopsy w/ loop electrode excision.    Medications: Gretta has a current medication list which includes the following prescription(s): aspirin.    Allergies: Review of patient's allergies indicates:  No Known Allergies     OBJECTIVE     See EMR under MEDIA for written consent provided 6/27/2023  Chaperone: declined    ORTHO SCREEN  Posture in sitting: WNL  Posture in standing: WNL  Pelvic alignment: Not assessed today    SI Joint Palpation: not assessed today    ABDOMINAL WALL ASSESSMENT  Palpation: WNL  Abdominal strength: Rectus abdominus: 5/5     Transverse abdominus: 5/5  Scarring: none observed    Linea alba at rest: a little over one finger width apart  Curl-up test for linea alba integrity:   Without transverse abdominus: Basom   Distortion with transverse abdominus: able to activate transverse abdominis appropriately  Curl-up test for Inter-rectus distance (IRD)    Without transverse abdominus: none width at the umbilicus, none width 2 inches above umbilicus, 1 finger width 2 inches below umbilicus   With transverse abdominus contraction: none width at the umbilicus, none width 2 inches above umbilicus, none width 2 inches below umbilicus   Abdominal wall musculature dominance: Internal oblique dominant: rib cage flairs, sternal angle increases, diaphragm lifts and linea alba pulls in on L- very minimal    BREATHING MECHANICS ASSESSMENT   Thorax Assessment During Quiet Respiration: WNL excursion of ribcage and WNL excursion of abdominal wall  Thorax Assessment During Deep Respiration: Decreased excursion bilaterally of lateral ribs  and Excessive excursion of sternum    VAGINAL PELVIC FLOOR EXAM    Deferred- no evidence for internal exam  needed      Limitation/Restriction for FOTO PFDI Survey    Therapist reviewed FOTO scores for Gretta Carson on 6/27/2023.   FOTO documents entered into [facility] - see Media section.    Limitation Score: 0%       TREATMENT     Total Treatment time (time-based codes) separate from Evaluation: 25 minutes     Therapeutic Activity to improve dynamic functional performance for 25 minutes including:    -reviewed patient activation of transverse abdominis in several functional activities she performs at work daily including:   -sit ups, crunch, plank, dead bug, lowering legs from 90  -reviewed intraabdominal pressure during high level activity performed at work     PATIENT EDUCATION AND HOME EXERCISES     Education provided:   general anatomy/physiology of urinary/ bowel  system and alternative methods of treatment were discussed with the patient. Additionally, anatomy/physiology of pelvic floor, posture/body mechanices, and coordination of transverse abdominis during high level activities were reviewed.   -patient provided with resources to find continuing exercises addressing history of diastasis recti and transverse abdominis activation    Written Home Exercises provided: yes.  Exercises were reviewed and Gretta was able to demonstrate them prior to the end of the session. Gretta demonstrated good  understanding of the education provided. See EMR under Patient Instructions for exercises provided during therapy sessions.    ASSESSMENT     Gretta is a 42 y.o. female referred to outpatient Physical Therapy with a medical diagnosis of diastasis recti. Pt presents with improved diastasis, measuring less than 1 finger width during transverse abdominis contraction. At rest, pt has slightly greater than 1 finger width at the region inferior to the umbilicus. Patient's recent focus on deep core/transverse abdominis activation has helped minimize her symptoms and presentation. Pt will benefit from continued at home exercises,  targeting transverse abdominis activation. Pt provided with verbal cues to activate transverse abdominis prior to sit up with great results/understanding. Skilled physical therapy is deferred at this time secondary to limited findings proving medical necessity.     Patient prognosis is Excellent.     Plan of care discussed with patient: Yes  Patient's spiritual, cultural and educational needs considered and patient is agreeable to the plan of care and goals as stated below:     Anticipated Barriers for therapy: none    Medical Necessity is demonstrated by the following:  Medical Necessity is demonstrated by the following  History  Co-morbidities and personal factors that may impact the plan of care [x] LOW: no personal factors / co-morbidities  [] MODERATE: 1-2 personal factors / co-morbidities  [] HIGH: 3+ personal factors / co-morbidities    Moderate / High Support Documentation:   Co-morbidities affecting plan of care:     Personal Factors:   no deficits     Examination  Body Structures and Functions, activity limitations and participation restrictions that may impact the plan of care [x] LOW: addressing 1-2 elements  [] MODERATE: 3+ elements  [] HIGH: 4+ elements (please support below)    Moderate / High Support Documentation:      Clinical Presentation [x] LOW: stable  [] MODERATE: Evolving  [] HIGH: Unstable     Decision Making/ Complexity Score: low        Short Term Goals: evaluation only    PLAN     Physical therapy deferred and patient discharged at this time.     Beatriz Green, PT  06/27/2023

## 2023-11-09 ENCOUNTER — TELEPHONE (OUTPATIENT)
Dept: MATERNAL FETAL MEDICINE | Facility: CLINIC | Age: 43
End: 2023-11-09
Payer: OTHER GOVERNMENT

## 2023-11-30 ENCOUNTER — TELEPHONE (OUTPATIENT)
Dept: MATERNAL FETAL MEDICINE | Facility: CLINIC | Age: 43
End: 2023-11-30
Payer: OTHER GOVERNMENT

## 2023-12-04 ENCOUNTER — PATIENT MESSAGE (OUTPATIENT)
Dept: MATERNAL FETAL MEDICINE | Facility: CLINIC | Age: 43
End: 2023-12-04

## 2023-12-04 ENCOUNTER — OFFICE VISIT (OUTPATIENT)
Dept: MATERNAL FETAL MEDICINE | Facility: CLINIC | Age: 43
End: 2023-12-04
Payer: OTHER GOVERNMENT

## 2023-12-04 DIAGNOSIS — Z82.79 FAMILY HISTORY OF CONGENITAL OR GENETIC CONDITION: Primary | ICD-10-CM

## 2023-12-04 DIAGNOSIS — Z31.5 ENCOUNTER FOR PROCREATIVE GENETIC COUNSELING: Primary | ICD-10-CM

## 2023-12-04 DIAGNOSIS — Z31.5 ENCOUNTER FOR PROCREATIVE GENETIC COUNSELING: ICD-10-CM

## 2023-12-04 PROCEDURE — 96040 PR GENETIC COUNSELING, EACH 30 MIN: CPT | Mod: 95,,, | Performed by: GENETIC COUNSELOR, MS

## 2023-12-04 PROCEDURE — 96040 PR GENETIC COUNSELING, EACH 30 MIN: ICD-10-PCS | Mod: 95,,, | Performed by: GENETIC COUNSELOR, MS

## 2023-12-04 NOTE — PROGRESS NOTES
Office Visit - Genetic Counseling Evaluation   Gretta Carson  : 1980  MRN: [id number]  REFERRING PROVIDER: Aaarefpastor Self  PARTNER NAME:  Hamilton    DATE OF SERVICE: 23  TIME SPENT: 55 min    REASON FOR CONSULT:  Gretta Carson, a 43 y.o. female with a previous pregnancy affected by Trisomy 13 was referred for genetic counseling to discuss her options for preconception genetic testing and her recurrence risks. She came to the appointment alone.     PREGNANCY HISTORY  G1: 26w- IUFD  G2: 37w5d-     FAMILY HISTORY:  Please see scanned pedigree in patient's chart under media. Patient's ancestry is unknown. FOB ancestry is unknown. Consanguinity was denied.     Gretta and her partner Hamilton have had one pregnancy together. The pregnancy was affected by Trisomy 13. Their daughter was named Marcos. Gretta had one previous pregnancy that resulted in a stillbirth at 6m she was young and reports that she was very stressed during this pregnancy. There was no additional cause identified for this loss. Hamilton has three healthy daughters.     Gretta has two maternal half siblings, they are both healthy. Her sister has two healthy children. She has multiple paternal half siblings, she does not know how many or the state of their health in most cases. Her mother has bipolar disorder and her father passed away from a heart attack when she was young.     Hamilton is 43y he had a heart attack in the past. He had three full siblings. Two  from a homicide. They each had healthy children. His remaining brother is healthy with no children. His mother is healthy. His father passed away due to unknown causes to Gretta.     Additional history negative for multiple miscarriages/stillbirth, developmental delay/intellectual disability, and known genetic disorders. Complete pedigree will be linked to this encounter and can be viewed under the Media tab. The information provided is based on the patient  and/or their reproductive partner's recollection of the family history and in the absence of complete medical records. If the family history changes or if more information is obtained, they were asked to contact us as this may alter the recommendations or impression of the family history.     PAST TESTING  Patient carrier screening: None in record  Reproductive partner carrier screening: NA  Parental Karyotypes: NA    COUNSELING:   Previous Pregnancy with Trisomy 13  Gretta was seen by MFM in her previous pregnancy for a diagnosis of Trisomy 13. 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 The patient and her partner have not undergone genetic testing for a balanced translocation,  however given the fetal karyotype results the likelihood of a rearrangement resulting in Trisomy 13 is not increased.      We reviewed the limitations of cfDNA testing in the diagnosis of Trisomy 13 and other chromosome disorders.     The range of developmental impairment and physical structural abnormalities that are seen with Trisomy 13 was not reviewed as Gretta is very aware of these features.      It is presumed that Trisomy 13 in Gretta's last pregnancy arose de diogo and therefore the risk to future pregnancies for Trisomy 13 is as high as 1%.     We discussed Gretta's age related risks for aneuploidy today. At maternal age 43 y.o., the age-associated risk for Trisomy 21 is 1 in 43. For any chromosome abnormality, the risk is 1 in 22.     We reviewed the association between maternal age and fetal aneuploidy, specifically reviewing that as maternal age increases, the likelihood of a fetus being affected with an abnormal number of chromosomes increases. The most common fetal aneuploidy is Trisomy 21. Increasing maternal age is also associated with increased risk for other fetal aneuploidies. Trisomy 21 is most often caused by an extra copy of  chromosome 21, this can occur via non-disjunction or an inherited translocation. Other common aneuploidies include Trisomy 13, Trisomy 18, and sex chromosome aneuploidies. These most often occur via non-disjunction.  These disorders vary greatly in terms of the severity of physical disability and/or intellectual and developmental disability associated with them.     In addition to the common fetal aneuploidies, all pregnancies, regardless of maternal age, can be affected with other types of abnormalities of chromosome structure or number. These types of chromosome abnormalities are not more common with increasing maternal age. For younger women, these types of chromosome abnormalities account for approximately 50% of all chromosomal abnormalities.  In women of advanced maternal age, these types of chromosome abnormalities account for approximately 25% of all chromosomal abnormalities.    We discussed the option of PGT-A to screen embryo's for aneuploidy. We discussed PGT-A, which is a preimplantation genetic test for individuals undergoing IVF with a high risk of fetal aneuploidies. PGT-A is an option for all IVF patients, it is designed to reduce the chances of miscarriage or complications due to aneuploidy in in-vitro fertilization (IVF) procedures prior to transfer and therefore improve success rates. It is important to keep in mind that this is a screen for chromosome abnormalities, there is a potential error rate of about 2%. For lab specific rates it is recommended to contact the specific PGT lab performing the testing.     Since PGT-A tests the embryonic cells at a blastocyst stage, the cells screened are not destined to become the fetus, instead they reflect the placental chromosomal make-up. Inherent to this test is the possibility of choosing not to implant a chromosomally normal fetus with confined placental mosaicism. If there is mosaicism detected through PGT-A, it can indicate a euploid, aneuploid or  mosaic fetus. It is impossible to test the fetal cells prior to implantation to learn if the fetus itself is affected. A mosaic fetus is more likely to miscarry and less likely to implant, however a mosaic ongoing pregnancy can result in possibly as many as 40% of cases. If considering PGT-A it is important to discuss contingency options with a fertility specialist.     In addition, PGT-A cannot detect chromosomal deletions, duplications, or single gene mutations. There are additional tests to assess for these genetic differences (PGT-M, PGT-SR). Given that PGT-A decreases but does not eliminate the risks for miscarriage and aneuploidy, regular prenatal monitoring is still recommended.     We discussed carrier screening including goals and typical panels. We also discussed the differences between population based, ethnic based and pan-ethnic expanded carrier screening. We discussed that carrier screening for population-based diseases is standard of practice. Testing such as this would include cystic fibrosis, sickle cell disease, SMA and the hemoglobinopathies. We discussed  screening for the above conditions.     Family history may dictate the inclusion of Fragile X; when reviewing Gretta's family history they shared that there is not a family history of intellectual disability or other features of Fragile X Syndrome such as premature ovarian failure or autism in males. Ethnic background may also dictate the inclusion of enzyme testing for Uli Sachs disease Gretta indicated that her ethnic background is not suggestive of a higher carrier rate.     Autosomal recessive inheritance was reviewed with the family and we discussed a plan should Gretta be identified as a carrier. Specifically, we reviewed that Gretta's partner could subsequently be tested for any mutations in the same gene.     Given this information we discussed that this is considered a screen and that there are limitations to this testing  including a false negative. Should the patient and her partner be interested, a residual risk can be provided after testing results to clarify this remaining chance.     The patient indicated that she did want to proceed with carrier screening at this time and is interested in the Inheritest 500 Plus option. She would like for she and her partner to be screened only if her results reveal carrier status.       DISCUSSION & IMPRESSION:  Gretta is a 43 y.o. female with a previous pregnancy affected by Trisomy 13. She is considering trying to conceive and wanted to get more information about her risks for genetic conditions. We discussed the above information. Gretta reported that the risks for aneuploidy were concerning for her, therefore we discussed PGT-A with IVF as a consideration for her. We discussed the further questions about the IVF process would be best answered by a fertility specialist. She also had questions about other genetic testing that can be completed prior to pregnancy. We discussed carrier screening and her options for expanded carrier screening.    TESTING OPTIONS  Diagnostic Testing: Not reviewed in detail   Carrier Screening:Options for carrier screening discussed- opts for Inheritest 500 Plus  Pregnancy Options: Not discussed today  Recurrence Risk: as high as 1% for Trisomy 13  Procedures/labs DECLINED today: NA    Gretta stated that she was appreciative of this information.     We reviewed Gretta's medical and family history. We discussed basics of genetics and preconception genetic testing options. Gretta was understanding of the information discussed in clinic and all questions were answered.     Per the indication for referral this visit was conducted by a genetic counselor. This patient will not be seen by an [facility] physician and your patient will not be scheduled for additional visits. If you have questions or concerns about this please reach out to our clinic and let us know any  additional concerns you hope to be addressed by the maternal fetal medicine physicians that may be out of the scope of this visit. Thank you for your referral and the opportunity to participate in the care of your patients.     RECOMMENDATIONS:  Inheritest 500 plus carrier screening    Irish Osei MS, CGC  Licensed, Certified Genetic Counselor  [facility]    The patient location is: home ([place])  The chief complaint leading to consultation is: previous child with a genetic condition    Visit type: audiovisual    Face to Face time with patient: 55 min  70 minutes of total time spent on the encounter, which includes face to face time and non-face to face time preparing to see the patient (eg, review of tests), Obtaining and/or reviewing separately obtained history, Documenting clinical information in the electronic or other health record, Independently interpreting results (not separately reported) and communicating results to the patient/family/caregiver, or Care coordination (not separately reported).         Each patient to whom he or she provides medical services by telemedicine is:  (1) informed of the relationship between the physician and patient and the respective role of any other health care provider with respect to management of the patient; and (2) notified that he or she may decline to receive medical services by telemedicine and may withdraw from such care at any time.    Notes:

## 2023-12-22 ENCOUNTER — LAB VISIT (OUTPATIENT)
Dept: LAB | Facility: HOSPITAL | Age: 43
End: 2023-12-22
Attending: OBSTETRICS & GYNECOLOGY
Payer: OTHER GOVERNMENT

## 2023-12-22 DIAGNOSIS — Z31.5 ENCOUNTER FOR PROCREATIVE GENETIC COUNSELING: ICD-10-CM

## 2023-12-22 PROCEDURE — 36415 COLL VENOUS BLD VENIPUNCTURE: CPT | Mod: PO | Performed by: OBSTETRICS & GYNECOLOGY

## 2024-01-10 LAB
GENETIC COUNSELING?: YES
GENSO SPECIMEN TYPE: NORMAL
MISCELLANEOUS GENETIC TEST NAME: NORMAL
PARTENTAL OR SIBLING TESTING?: NO
REFERENCE LAB: NORMAL
TEST RESULT: NORMAL

## 2024-03-07 ENCOUNTER — TELEPHONE (OUTPATIENT)
Dept: GENETICS | Facility: CLINIC | Age: 44
End: 2024-03-07
Payer: OTHER GOVERNMENT

## 2024-03-07 ENCOUNTER — PATIENT MESSAGE (OUTPATIENT)
Dept: GENETICS | Facility: CLINIC | Age: 44
End: 2024-03-07
Payer: OTHER GOVERNMENT

## 2024-03-07 NOTE — TELEPHONE ENCOUNTER
Spoke to Gretta about genetic testing results. 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 I explained autosomal recessive inheritance. She would like to get her partner tested. I will send referral to [facility] for counseling and testing.

## 2024-03-28 NOTE — PROGRESS NOTES
36 y.o.  presents for pre-op H&P for leep.  No LMP recorded. Patient is not currently having periods (Reason: Birth Control)..    Past Medical History:   Diagnosis Date    Abnormal Pap smear of cervix     PTSD (post-traumatic stress disorder)      Past Surgical History:   Procedure Laterality Date    COLPOSCOPY      HERNIA REPAIR       Family History   Problem Relation Age of Onset    Breast cancer Neg Hx     Miscarriages / Stillbirths Neg Hx      Review of patient's allergies indicates:  No Known Allergies    Current Outpatient Prescriptions:     medroxyPROGESTERone (DEPO-PROVERA) 150 mg/mL injection, Inject 150 mg into the muscle every 3 (three) months., Disp: , Rfl:   Social History     Social History    Marital status: Single     Spouse name: N/A    Number of children: N/A    Years of education: N/A     Occupational History    Not on file.     Social History Main Topics    Smoking status: Never Smoker    Smokeless tobacco: Never Used    Alcohol use No    Drug use: No    Sexual activity: Yes     Partners: Male     Birth control/ protection: Injection     Other Topics Concern    Not on file     Social History Narrative    No narrative on file         Last pap hsil  Last pathology janee III  Last ultrasound none    Vitals:    17 1410   BP: 109/76   Pulse: 67     General Appearance: Alert, appropriate appearance for age. No acute distress, Chest/Respiratory Exam: Normal.   Cardiovascular Exam: regular rate and rhythm   Gastrointestinal Exam: soft, non-tender; bowel sounds normal; no masses,  no organomegaly  Pelvic Exam Female: Exam deferred.   Psychiatric Exam: Alert and oriented, appropriate affect.    Assessment: JANEE III    Plan: LEEP scheduled  I have discussed the risks, benefits, indications, and alternatives of the procedure in detail.  The patient verbalizes her understanding.  All questions answered.  Consents signed.  The patient agrees to proceed to proceed as planned.    
28-Mar-2024 14:53

## 2024-05-22 NOTE — TELEPHONE ENCOUNTER
----- Message from Kellen Maciel sent at 8/8/2017  3:33 PM CDT -----  Contact: Clary with [employer] at [phone] ext [phone], claim # [id number]  Clary with [employer] at [phone] ext [phone], claim # [id number], Calling to validate information on this patient. Please advise. Thanks.   none

## 2024-10-03 DIAGNOSIS — M25.552 LEFT HIP PAIN: Primary | ICD-10-CM

## 2024-10-14 ENCOUNTER — CLINICAL SUPPORT (OUTPATIENT)
Facility: HOSPITAL | Age: 44
End: 2024-10-14
Payer: OTHER GOVERNMENT

## 2024-10-14 DIAGNOSIS — M25.552 LEFT HIP PAIN: Primary | Chronic | ICD-10-CM

## 2024-10-14 PROCEDURE — 97810 ACUP 1/> WO ESTIM 1ST 15 MIN: CPT | Mod: PO

## 2024-10-14 PROCEDURE — 97811 ACUP 1/> W/O ESTIM EA ADD 15: CPT | Mod: PO

## 2024-10-15 NOTE — PROGRESS NOTES
"  Acupuncture Evaluation Note     Name: Gretta Carson  Clinic Number: [id number]    Traditional Chinese Medicine (TCM) Diagnosis: Qi Stagnation  Medical Diagnosis:   Encounter Diagnosis   Name Primary?    Left hip pain Yes        Evaluation Date: 10/16/2024    Visit #/Visits authorized: 1/8    Precautions: Standard    Subjective     Chief Concern: Joint pain; chronic pain of left hip; Stress relief desired    Medical necessity is demonstrated by the following IMPAIRMENTS: Medical Necessity: Decreased mobility limits day to day activities, social, and emergent situations              Aggravating Factors:  movement and pressure   Relieving Factors:  rest    Symptom Description:     Quality:  Aching  Severity:  5 but varies  Frequency:  when active    Previous Treatments Tried:   not much relief from prior    HEENT:  nothing of note    Chest:  nothing of note    Digestion:     Diet: in general, a "healthy" diet     Fluids: no unusual history of fluids, ,   Taste/Appetite: normal   Symptoms: no blood in stool    Sleep: could improve    Energy Levels:  could improve    Psychological Symptoms:  stress but resiliency    Other Symptoms: not noted    GYN Symptoms: not noted    Objective     Observation: clean and calm    Tongue:      Body:  normal   Color:  pink   Coating:  thin,     Pulse:        thready       New Findings:  none    Treatment     Treatment Principles:  Move Qi    Acupuncture points used:  K3, UB 62, SI3/4, TB 5, GB 20    Needles In: 10  Needles Out: 10  Needles W/O STIM placed: 2pm  [place] W/O STIM removed: 2:30pm      Other Traditional Chinese Medicine Modalities - none    Assessment     After treatment, patient felt more relaxed and less stressed    Patient prognosis is Good.     Patient will continue to benefit from acupuncture treatment to address the deficits listed in the problem list box on initial evaluation, provide patient family education and to maximize pt's level of independence in the home and " community environment.     Patient's spiritual, cultural and educational needs considered and pt agreeable to plan of care and goals.     Anticipated barriers to treatment: none    Plan     Recommend every week or two for 12 sessions with midway re-assess.      Education:  Patient is aware of cumulative benefit of acupuncture

## 2024-10-21 ENCOUNTER — CLINICAL SUPPORT (OUTPATIENT)
Facility: HOSPITAL | Age: 44
End: 2024-10-21
Payer: OTHER GOVERNMENT

## 2024-10-21 DIAGNOSIS — M25.552 LEFT HIP PAIN: Primary | ICD-10-CM

## 2024-10-21 PROCEDURE — 97810 ACUP 1/> WO ESTIM 1ST 15 MIN: CPT | Mod: PO

## 2024-10-21 PROCEDURE — 97811 ACUP 1/> W/O ESTIM EA ADD 15: CPT | Mod: PO

## 2024-10-23 NOTE — PROGRESS NOTES
"  Acupuncture Evaluation Note     Name: Gretta Carson  Clinic Number: [id number]    Traditional Chinese Medicine (TCM) Diagnosis: Qi Stagnation  Medical Diagnosis:   Encounter Diagnosis   Name Primary?    Left hip pain Yes        Evaluation Date: 10/21/24    Visit #/Visits authorized: 2/8    Precautions: Standard    Subjective     Chief Concern: Joint pain; chronic pain of left hip; Stress relief desired. Please note, loss of infant in 2021 and does not want to be confronted with various/intrusive questions about it; wondering if its possible to flag in Epic/VA/etc to not be asked about certain things or to handle delicately those forms and questions. I have inquired about such with some [facility] reps and we are continuing to inquire....    Medical necessity is demonstrated by the following IMPAIRMENTS: Medical Necessity: Decreased mobility limits day to day activities, social, and emergent situations              Aggravating Factors:  movement and pressure   Relieving Factors:  rest    Symptom Description:     Quality:  Aching  Severity:  5 but varies  Frequency:  when active    Previous Treatments Tried:   not much relief from prior    HEENT:  nothing of note    Chest:  nothing of note    Digestion:     Diet: in general, a "healthy" diet     Fluids: no unusual history of fluids, ,   Taste/Appetite: normal   Symptoms: no blood in stool    Sleep: could improve    Energy Levels:  could improve    Psychological Symptoms:  stress but resiliency    Other Symptoms: not noted    GYN Symptoms: not noted    Objective     Observation: clean and calm    Tongue:      Body:  normal   Color:  pink   Coating:  thin,     Pulse:        thready       New Findings:  none    Treatment     Treatment Principles:  Move Qi    Acupuncture points used:  K3, UB 62, SI3/4, TB 5, GB 20    Needles In: 10  Needles Out: 10  Needles W/O STIM placed: 10am  [place] W/O STIM removed: 10:30am      Other Traditional Chinese Medicine Modalities - " none    Assessment     After treatment, patient felt more relaxed and less stressed    Patient prognosis is Good.     Patient will continue to benefit from acupuncture treatment to address the deficits listed in the problem list box on initial evaluation, provide patient family education and to maximize pt's level of independence in the home and community environment.     Patient's spiritual, cultural and educational needs considered and pt agreeable to plan of care and goals.     Anticipated barriers to treatment: none    Plan     Recommend every week or two for 12 sessions with midway re-assess.      Education:  Patient is aware of cumulative benefit of acupuncture

## 2024-11-18 ENCOUNTER — CLINICAL SUPPORT (OUTPATIENT)
Facility: HOSPITAL | Age: 44
End: 2024-11-18
Payer: OTHER GOVERNMENT

## 2024-11-18 DIAGNOSIS — M25.552 LEFT HIP PAIN: Primary | ICD-10-CM

## 2024-11-18 PROCEDURE — 97810 ACUP 1/> WO ESTIM 1ST 15 MIN: CPT | Mod: PO

## 2024-11-18 PROCEDURE — 97811 ACUP 1/> W/O ESTIM EA ADD 15: CPT | Mod: PO

## 2024-11-20 NOTE — PROGRESS NOTES
"  Acupuncture Evaluation Note     Name: Gretta Carson  Clinic Number: [id number]    Traditional Chinese Medicine (TCM) Diagnosis: Qi Stagnation  Medical Diagnosis:   Encounter Diagnosis   Name Primary?    Left hip pain Yes        Evaluation Date: 11/18/24    Visit #/Visits authorized: 3/8    Precautions: Standard    Subjective     Chief Concern: Joint pain; chronic pain of left hip; Stress relief desired. Please note, loss of infant in 2021 and does not want to be confronted with various/intrusive questions about it; wondering if its possible to flag in Epic/VA/etc to not be asked about certain things or to handle delicately those forms and questions. I have inquired about such with some [facility] reps and we are continuing to inquire....    Medical necessity is demonstrated by the following IMPAIRMENTS: Medical Necessity: Decreased mobility limits day to day activities, social, and emergent situations              Aggravating Factors:  movement and pressure   Relieving Factors:  rest    Symptom Description:     Quality:  Aching  Severity:  5 but varies  Frequency:  when active    Previous Treatments Tried:   not much relief from prior    HEENT:  nothing of note    Chest:  nothing of note    Digestion:     Diet: in general, a "healthy" diet     Fluids: no unusual history of fluids, ,   Taste/Appetite: normal   Symptoms: no blood in stool    Sleep: could improve    Energy Levels:  could improve    Psychological Symptoms:  stress but resiliency    Other Symptoms: not noted    GYN Symptoms: not noted    Objective     Observation: clean and calm    Tongue:      Body:  normal   Color:  pink   Coating:  thin,     Pulse:        thready       New Findings:  none    Treatment     Treatment Principles:  Move Qi    Acupuncture points used:  K3, UB 62, SI3/4, TB 5, GB 20    Needles In: 10  Needles Out: 10  Needles W/O STIM placed: 3pm  [place] W/O STIM removed: 3:30pm    Other Traditional Chinese Medicine Modalities - none    Assessment "     After treatment, patient felt more relaxed and less stressed    Patient prognosis is Good.     Patient will continue to benefit from acupuncture treatment to address the deficits listed in the problem list box on initial evaluation, provide patient family education and to maximize pt's level of independence in the home and community environment.     Patient's spiritual, cultural and educational needs considered and pt agreeable to plan of care and goals.     Anticipated barriers to treatment: none    Plan     Recommend every week or two for 12 sessions with midway re-assess.      Education:  Patient is aware of cumulative benefit of acupuncture

## 2024-11-25 ENCOUNTER — CLINICAL SUPPORT (OUTPATIENT)
Facility: HOSPITAL | Age: 44
End: 2024-11-25
Payer: OTHER GOVERNMENT

## 2024-11-25 DIAGNOSIS — M25.552 LEFT HIP PAIN: Primary | ICD-10-CM

## 2024-11-25 PROCEDURE — 97811 ACUP 1/> W/O ESTIM EA ADD 15: CPT | Mod: PO

## 2024-11-25 PROCEDURE — 97810 ACUP 1/> WO ESTIM 1ST 15 MIN: CPT | Mod: PO

## 2024-11-27 NOTE — PROGRESS NOTES
"  Acupuncture Evaluation Note     Name: Gretta Carson  Clinic Number: [id number]    Traditional Chinese Medicine (TCM) Diagnosis: Qi Stagnation  Medical Diagnosis:   Encounter Diagnosis   Name Primary?    Left hip pain Yes        Evaluation Date: 11/25/24    Visit #/Visits authorized: 4/8    Precautions: Standard    Subjective     Chief Concern: Joint pain; chronic pain of left hip; Stress relief desired. Please note, loss of infant in 2021 and does not want to be confronted with various/intrusive questions about it; wondering if its possible to flag in Epic/VA/etc to not be asked about certain things or to handle delicately those forms and questions. I have inquired about such with some [employer] reps and we are continuing to inquire....    Medical necessity is demonstrated by the following IMPAIRMENTS: Medical Necessity: Decreased mobility limits day to day activities, social, and emergent situations              Aggravating Factors:  movement and pressure   Relieving Factors:  rest    Symptom Description:     Quality:  Aching  Severity:  5 but varies  Frequency:  when active    Previous Treatments Tried:   not much relief from prior    HEENT:  nothing of note    Chest:  nothing of note    Digestion:     Diet: in general, a "healthy" diet     Fluids: no unusual history of fluids, ,   Taste/Appetite: normal   Symptoms: no blood in stool    Sleep: could improve    Energy Levels:  could improve    Psychological Symptoms:  stress but resiliency    Other Symptoms: not noted    GYN Symptoms: not noted    Objective     Observation: clean and calm    Tongue:      Body:  normal   Color:  pink   Coating:  thin,     Pulse:        thready       New Findings:  none    Treatment     Treatment Principles:  Move Qi    Acupuncture points used:  K3, UB 62, SI3/4, TB 5, GB 20    Needles In: 10  Needles Out: 10  Needles W/O STIM placed: 2pm  [place] W/O STIM removed: 2:30pm    Other Traditional Chinese Medicine Modalities - none    Assessment "     After treatment, patient felt more relaxed and less stressed    Patient prognosis is Good.     Patient will continue to benefit from acupuncture treatment to address the deficits listed in the problem list box on initial evaluation, provide patient family education and to maximize pt's level of independence in the home and community environment.     Patient's spiritual, cultural and educational needs considered and pt agreeable to plan of care and goals.     Anticipated barriers to treatment: none    Plan     Recommend every week or two for 12 sessions with midway re-assess.      Education:  Patient is aware of cumulative benefit of acupuncture

## 2024-12-02 ENCOUNTER — CLINICAL SUPPORT (OUTPATIENT)
Facility: HOSPITAL | Age: 44
End: 2024-12-02
Payer: OTHER GOVERNMENT

## 2024-12-02 DIAGNOSIS — M25.552 LEFT HIP PAIN: Primary | ICD-10-CM

## 2024-12-02 PROCEDURE — 97811 ACUP 1/> W/O ESTIM EA ADD 15: CPT | Mod: PO

## 2024-12-02 PROCEDURE — 97810 ACUP 1/> WO ESTIM 1ST 15 MIN: CPT | Mod: PO

## 2024-12-03 NOTE — PROGRESS NOTES
"  Acupuncture Evaluation Note     Name: Gretta Carson  Clinic Number: [id number]    Traditional Chinese Medicine (TCM) Diagnosis: Qi Stagnation  Medical Diagnosis:   Encounter Diagnosis   Name Primary?    Left hip pain Yes          Evaluation Date: 12/2/24    Visit #/Visits authorized: 5/8    Precautions: Standard    Subjective     Chief Concern: Joint pain; chronic pain of left hip; Stress relief desired. Please note, loss of infant in 2021 and does not want to be confronted with various/intrusive questions about it; wondering if its possible to flag in Epic/VA/etc to not be asked about certain things or to handle delicately those forms and questions. I have inquired about such with some [employer] and we are continuing to inquire....    Medical necessity is demonstrated by the following IMPAIRMENTS: Medical Necessity: Decreased mobility limits day to day activities, social, and emergent situations              Aggravating Factors:  movement and pressure   Relieving Factors:  rest    Symptom Description:     Quality:  Aching  Severity:  5 but varies  Frequency:  when active    Previous Treatments Tried:   not much relief from prior    HEENT:  nothing of note    Chest:  nothing of note    Digestion:     Diet: in general, a "healthy" diet     Fluids: no unusual history of fluids, ,   Taste/Appetite: normal   Symptoms: no blood in stool    Sleep: could improve    Energy Levels:  could improve    Psychological Symptoms:  stress but resiliency    Other Symptoms: not noted    GYN Symptoms: not noted    Objective     Observation: clean and calm    Tongue:      Body:  normal   Color:  pink   Coating:  thin,     Pulse:        thready       New Findings:  none    Treatment     Treatment Principles:  Move Qi    Acupuncture points used:  K3, UB 62, SI3/4, TB 5, GB 20    Needles In: 10  Needles Out: 10  Needles W/O STIM placed: 2pm  [place] W/O STIM removed: 2:30pm    Other Traditional Chinese Medicine Modalities - " none    Assessment     After treatment, patient felt more relaxed and less stressed    Patient prognosis is Good.     Patient will continue to benefit from acupuncture treatment to address the deficits listed in the problem list box on initial evaluation, provide patient family education and to maximize pt's level of independence in the home and community environment.     Patient's spiritual, cultural and educational needs considered and pt agreeable to plan of care and goals.     Anticipated barriers to treatment: none    Plan     Recommend every week or two for 12 sessions with midway re-assess.      Education:  Patient is aware of cumulative benefit of acupuncture

## 2024-12-09 ENCOUNTER — CLINICAL SUPPORT (OUTPATIENT)
Facility: HOSPITAL | Age: 44
End: 2024-12-09
Payer: OTHER GOVERNMENT

## 2024-12-09 DIAGNOSIS — M25.552 LEFT HIP PAIN: Primary | ICD-10-CM

## 2024-12-09 PROCEDURE — 97811 ACUP 1/> W/O ESTIM EA ADD 15: CPT | Mod: PO

## 2024-12-09 PROCEDURE — 97810 ACUP 1/> WO ESTIM 1ST 15 MIN: CPT | Mod: PO

## 2024-12-12 NOTE — PROGRESS NOTES
"  Acupuncture Evaluation Note     Name: Gretta Carson  Clinic Number: [id number]    Traditional Chinese Medicine (TCM) Diagnosis: Qi Stagnation  Medical Diagnosis:   Encounter Diagnosis   Name Primary?    Left hip pain Yes          Evaluation Date: 12/9/24    Visit #/Visits authorized: 6/8    Precautions: Standard    Subjective     Chief Concern: Joint pain; chronic pain of left hip; Stress relief desired. Please note, loss of infant in 2021 and does not want to be confronted with various/intrusive questions about it; wondering if its possible to flag in Epic/VA/etc to not be asked about certain things or to handle delicately those forms and questions. I have inquired about such with some [employer] and we are continuing to inquire....    Medical necessity is demonstrated by the following IMPAIRMENTS: Medical Necessity: Decreased mobility limits day to day activities, social, and emergent situations              Aggravating Factors:  movement and pressure   Relieving Factors:  rest    Symptom Description:     Quality:  Aching  Severity:  5 but varies  Frequency:  when active    Previous Treatments Tried:   not much relief from prior    HEENT:  nothing of note    Chest:  nothing of note    Digestion:     Diet: in general, a "healthy" diet     Fluids: no unusual history of fluids, ,   Taste/Appetite: normal   Symptoms: no blood in stool    Sleep: could improve    Energy Levels:  could improve    Psychological Symptoms:  stress but resiliency    Other Symptoms: not noted    GYN Symptoms: not noted    Objective     Observation: clean and calm    Tongue:      Body:  normal   Color:  pink   Coating:  thin,     Pulse:        thready       New Findings:  none    Treatment     Treatment Principles:  Move Qi    Acupuncture points used:  K3, UB 62, SI3/4, TB 5, GB 20    Needles In: 10  Needles Out: 10  Needles W/O STIM placed: 2pm  [place] W/O STIM removed: 2:30pm    Other Traditional Chinese Medicine Modalities - " none    Assessment     After treatment, patient felt more relaxed and less stressed    Patient prognosis is Good.     Patient will continue to benefit from acupuncture treatment to address the deficits listed in the problem list box on initial evaluation, provide patient family education and to maximize pt's level of independence in the home and community environment.     Patient's spiritual, cultural and educational needs considered and pt agreeable to plan of care and goals.     Anticipated barriers to treatment: none    Plan     Recommend every week or two for 12 sessions with midway re-assess.      Education:  Patient is aware of cumulative benefit of acupuncture

## 2024-12-16 ENCOUNTER — CLINICAL SUPPORT (OUTPATIENT)
Facility: HOSPITAL | Age: 44
End: 2024-12-16
Payer: OTHER GOVERNMENT

## 2024-12-16 DIAGNOSIS — M25.552 LEFT HIP PAIN: Primary | ICD-10-CM

## 2024-12-16 PROCEDURE — 97810 ACUP 1/> WO ESTIM 1ST 15 MIN: CPT | Mod: PO

## 2024-12-16 PROCEDURE — 97811 ACUP 1/> W/O ESTIM EA ADD 15: CPT | Mod: PO

## 2024-12-19 NOTE — PROGRESS NOTES
"  Acupuncture Evaluation Note     Name: Gretta Carson  Clinic Number: [id number]    Traditional Chinese Medicine (TCM) Diagnosis: Qi Stagnation  Medical Diagnosis:   Encounter Diagnosis   Name Primary?    Left hip pain Yes          Evaluation Date: 12/16/24    Visit #/Visits authorized: 7/8    Precautions: Standard    Subjective     Chief Concern: Joint pain; chronic pain of left hip; Stress relief desired. Please note, loss of infant in 2021 and does not want to be confronted with various/intrusive questions about it; wondering if its possible to flag in Epic/VA/etc to not be asked about certain things or to handle delicately those forms and questions. I have inquired about such with some [employer] and we are continuing to inquire....    Medical necessity is demonstrated by the following IMPAIRMENTS: Medical Necessity: Decreased mobility limits day to day activities, social, and emergent situations              Aggravating Factors:  movement and pressure   Relieving Factors:  rest    Symptom Description:     Quality:  Aching  Severity:  5 but varies  Frequency:  when active    Previous Treatments Tried:   not much relief from prior    HEENT:  nothing of note    Chest:  nothing of note    Digestion:     Diet: in general, a "healthy" diet     Fluids: no unusual history of fluids, ,   Taste/Appetite: normal   Symptoms: no blood in stool    Sleep: could improve    Energy Levels:  could improve    Psychological Symptoms:  stress but resiliency    Other Symptoms: not noted    GYN Symptoms: not noted    Objective     Observation: clean and calm    Tongue:      Body:  normal   Color:  pink   Coating:  thin,     Pulse:        thready       New Findings:  none    Treatment     Treatment Principles:  Move Qi    Acupuncture points used:  K3, UB 62, SI3/4, TB 5, GB 20    Needles In: 10  Needles Out: 10  Needles W/O STIM placed: 2pm  [place] W/O STIM removed: 2:30pm    Other Traditional Chinese Medicine Modalities - " none    Assessment     After treatment, patient felt more relaxed and less stressed    Patient prognosis is Good.     Patient will continue to benefit from acupuncture treatment to address the deficits listed in the problem list box on initial evaluation, provide patient family education and to maximize pt's level of independence in the home and community environment.     Patient's spiritual, cultural and educational needs considered and pt agreeable to plan of care and goals.     Anticipated barriers to treatment: none    Plan     Recommend every week or two for 12 sessions with midway re-assess.      Education:  Patient is aware of cumulative benefit of acupuncture

## 2024-12-23 ENCOUNTER — CLINICAL SUPPORT (OUTPATIENT)
Facility: HOSPITAL | Age: 44
End: 2024-12-23
Payer: OTHER GOVERNMENT

## 2024-12-23 DIAGNOSIS — M25.552 LEFT HIP PAIN: Primary | ICD-10-CM

## 2024-12-23 PROCEDURE — 97810 ACUP 1/> WO ESTIM 1ST 15 MIN: CPT | Mod: PO

## 2024-12-23 PROCEDURE — 97811 ACUP 1/> W/O ESTIM EA ADD 15: CPT | Mod: PO

## 2024-12-25 NOTE — PROGRESS NOTES
"  Acupuncture Evaluation Note     Name: Gretta Carson  Clinic Number: [id number]    Traditional Chinese Medicine (TCM) Diagnosis: Qi Stagnation  Medical Diagnosis:   Encounter Diagnosis   Name Primary?    Left hip pain Yes          Evaluation Date: 12/23/24    Visit #/Visits authorized: 8/8    Precautions: Standard    Subjective     Chief Concern: Joint pain; chronic pain of left hip; Stress relief desired. Please note, loss of infant in 2021 and does not want to be confronted with various/intrusive questions about it; wondering if its possible to flag in Epic/VA/etc to not be asked about certain things or to handle delicately those forms and questions. I have inquired about such with some [employer] and we are continuing to inquire....    Medical necessity is demonstrated by the following IMPAIRMENTS: Medical Necessity: Decreased mobility limits day to day activities, social, and emergent situations              Aggravating Factors:  movement and pressure   Relieving Factors:  rest    Symptom Description:     Quality:  Aching  Severity:  5 but varies  Frequency:  when active    Previous Treatments Tried:   not much relief from prior    HEENT:  nothing of note    Chest:  nothing of note    Digestion:     Diet: in general, a "healthy" diet     Fluids: no unusual history of fluids, ,   Taste/Appetite: normal   Symptoms: no blood in stool    Sleep: could improve    Energy Levels:  could improve    Psychological Symptoms:  stress but resiliency    Other Symptoms: not noted    GYN Symptoms: not noted    Objective     Observation: clean and calm    Tongue:      Body:  normal   Color:  pink   Coating:  thin,     Pulse:        thready       New Findings:  none    Treatment     Treatment Principles:  Move Qi    Acupuncture points used:  K3, UB 62, SI3/4, TB 5, GB 20    Needles In: 10  Needles Out: 10  Needles W/O STIM placed: 2pm  [place] W/O STIM removed: 2:30pm    Other Traditional Chinese Medicine Modalities - " none    Assessment     After treatment, patient felt more relaxed and less stressed    Patient prognosis is Good.     Patient will continue to benefit from acupuncture treatment to address the deficits listed in the problem list box on initial evaluation, provide patient family education and to maximize pt's level of independence in the home and community environment.     Patient's spiritual, cultural and educational needs considered and pt agreeable to plan of care and goals.     Anticipated barriers to treatment: none    Plan     Recommend every week or two for 12 sessions with midway re-assess.      Education:  Patient is aware of cumulative benefit of acupuncture